# Patient Record
Sex: FEMALE | Race: WHITE | NOT HISPANIC OR LATINO
[De-identification: names, ages, dates, MRNs, and addresses within clinical notes are randomized per-mention and may not be internally consistent; named-entity substitution may affect disease eponyms.]

---

## 2018-03-13 PROBLEM — Z00.00 ENCOUNTER FOR PREVENTIVE HEALTH EXAMINATION: Status: ACTIVE | Noted: 2018-03-13

## 2018-03-28 ENCOUNTER — APPOINTMENT (OUTPATIENT)
Dept: OTOLARYNGOLOGY | Facility: CLINIC | Age: 68
End: 2018-03-28
Payer: MEDICARE

## 2018-03-28 VITALS
HEART RATE: 84 BPM | OXYGEN SATURATION: 95 % | SYSTOLIC BLOOD PRESSURE: 119 MMHG | TEMPERATURE: 98.8 F | DIASTOLIC BLOOD PRESSURE: 75 MMHG

## 2018-03-28 VITALS — WEIGHT: 110 LBS | HEIGHT: 59 IN | BODY MASS INDEX: 22.18 KG/M2

## 2018-03-28 DIAGNOSIS — Z86.018 PERSONAL HISTORY OF OTHER BENIGN NEOPLASM: ICD-10-CM

## 2018-03-28 PROCEDURE — 99204 OFFICE O/P NEW MOD 45 MIN: CPT | Mod: 25

## 2018-03-28 PROCEDURE — 31237 NSL/SINS NDSC SURG BX POLYPC: CPT | Mod: 50

## 2018-04-09 ENCOUNTER — APPOINTMENT (OUTPATIENT)
Dept: OTOLARYNGOLOGY | Facility: CLINIC | Age: 68
End: 2018-04-09
Payer: MEDICARE

## 2018-04-09 VITALS
SYSTOLIC BLOOD PRESSURE: 159 MMHG | DIASTOLIC BLOOD PRESSURE: 87 MMHG | HEART RATE: 83 BPM | TEMPERATURE: 98.2 F | OXYGEN SATURATION: 96 % | RESPIRATION RATE: 17 BRPM

## 2018-04-09 DIAGNOSIS — Z86.69 PERSONAL HISTORY OF OTHER DISEASES OF THE NERVOUS SYSTEM AND SENSE ORGANS: ICD-10-CM

## 2018-04-09 PROCEDURE — 31237 NSL/SINS NDSC SURG BX POLYPC: CPT | Mod: LT

## 2018-04-09 PROCEDURE — 99214 OFFICE O/P EST MOD 30 MIN: CPT | Mod: 25

## 2018-05-07 ENCOUNTER — APPOINTMENT (OUTPATIENT)
Dept: OTOLARYNGOLOGY | Facility: CLINIC | Age: 68
End: 2018-05-07

## 2018-05-07 ENCOUNTER — APPOINTMENT (OUTPATIENT)
Dept: OTOLARYNGOLOGY | Facility: CLINIC | Age: 68
End: 2018-05-07
Payer: MEDICARE

## 2018-05-07 VITALS
DIASTOLIC BLOOD PRESSURE: 76 MMHG | OXYGEN SATURATION: 97 % | BODY MASS INDEX: 22.18 KG/M2 | TEMPERATURE: 97.6 F | SYSTOLIC BLOOD PRESSURE: 131 MMHG | HEART RATE: 76 BPM | HEIGHT: 59 IN | WEIGHT: 110 LBS

## 2018-05-07 PROCEDURE — 99214 OFFICE O/P EST MOD 30 MIN: CPT | Mod: 25

## 2018-05-07 PROCEDURE — 31231 NASAL ENDOSCOPY DX: CPT

## 2018-05-22 ENCOUNTER — OUTPATIENT (OUTPATIENT)
Dept: OUTPATIENT SERVICES | Facility: HOSPITAL | Age: 68
LOS: 1 days | End: 2018-05-22
Payer: MEDICARE

## 2018-05-22 ENCOUNTER — RESULT REVIEW (OUTPATIENT)
Age: 68
End: 2018-05-22

## 2018-05-22 DIAGNOSIS — D32.9 BENIGN NEOPLASM OF MENINGES, UNSPECIFIED: ICD-10-CM

## 2018-05-24 LAB — SURGICAL PATHOLOGY STUDY: SIGNIFICANT CHANGE UP

## 2018-06-19 PROCEDURE — C1889: CPT

## 2018-06-19 PROCEDURE — C1887: CPT

## 2018-06-19 PROCEDURE — 88321 CONSLTJ&REPRT SLD PREP ELSWR: CPT

## 2018-06-19 PROCEDURE — C1769: CPT

## 2018-06-19 PROCEDURE — C1894: CPT

## 2019-05-08 ENCOUNTER — LABORATORY RESULT (OUTPATIENT)
Age: 69
End: 2019-05-08

## 2019-05-08 ENCOUNTER — APPOINTMENT (OUTPATIENT)
Dept: NEPHROLOGY | Facility: CLINIC | Age: 69
End: 2019-05-08
Payer: MEDICARE

## 2019-05-08 VITALS — DIASTOLIC BLOOD PRESSURE: 70 MMHG | SYSTOLIC BLOOD PRESSURE: 106 MMHG | HEART RATE: 108 BPM

## 2019-05-08 VITALS — DIASTOLIC BLOOD PRESSURE: 64 MMHG | HEART RATE: 96 BPM | SYSTOLIC BLOOD PRESSURE: 108 MMHG

## 2019-05-08 VITALS — SYSTOLIC BLOOD PRESSURE: 112 MMHG | DIASTOLIC BLOOD PRESSURE: 70 MMHG | HEART RATE: 84 BPM

## 2019-05-08 DIAGNOSIS — Z79.899 OTHER LONG TERM (CURRENT) DRUG THERAPY: ICD-10-CM

## 2019-05-08 PROCEDURE — 36415 COLL VENOUS BLD VENIPUNCTURE: CPT

## 2019-05-08 PROCEDURE — 99205 OFFICE O/P NEW HI 60 MIN: CPT | Mod: 25

## 2019-05-08 PROCEDURE — 93000 ELECTROCARDIOGRAM COMPLETE: CPT

## 2019-05-08 NOTE — PHYSICAL EXAM
[General Appearance - Alert] : alert [PERRL With Normal Accommodation] : pupils were equal in size, round, and reactive to light [Sclera] : the sclera and conjunctiva were normal [General Appearance - In No Acute Distress] : in no acute distress [Extraocular Movements] : extraocular movements were intact [Oropharynx] : the oropharynx was normal [Outer Ear] : the ears and nose were normal in appearance [Neck Appearance] : the appearance of the neck was normal [Jugular Venous Distention Increased] : there was no jugular-venous distention [Neck Cervical Mass (___cm)] : no neck mass was observed [Thyroid Diffuse Enlargement] : the thyroid was not enlarged [Thyroid Nodule] : there were no palpable thyroid nodules [Auscultation Breath Sounds / Voice Sounds] : lungs were clear to auscultation bilaterally [Heart Rate And Rhythm] : heart rate was normal and rhythm regular [Heart Sounds Pericardial Friction Rub] : no pericardial rub [Heart Sounds Gallop] : no gallops [Heart Sounds] : normal S1 and S2 [Murmurs] : no murmurs [Bowel Sounds] : normal bowel sounds [Abdomen Soft] : soft [Edema] : there was no peripheral edema [Abdomen Tenderness] : non-tender [Abdomen Mass (___ Cm)] : no abdominal mass palpated [No Spinal Tenderness] : no spinal tenderness [No CVA Tenderness] : no ~M costovertebral angle tenderness [Musculoskeletal - Swelling] : no joint swelling seen [Abnormal Walk] : normal gait [Nail Clubbing] : no clubbing  or cyanosis of the fingernails [Motor Tone] : muscle strength and tone were normal [Skin Turgor] : normal skin turgor [Skin Color & Pigmentation] : normal skin color and pigmentation [No Focal Deficits] : no focal deficits [] : no rash [Oriented To Time, Place, And Person] : oriented to person, place, and time [Impaired Insight] : insight and judgment were intact [Affect] : the affect was normal [FreeTextEntry1] : 2cm round subcutaneous mass on right lateral thigh with irregular contour

## 2019-05-08 NOTE — REASON FOR VISIT
[Initial Evaluation] : an initial evaluation [Other: _____] : [unfilled] [FreeTextEntry1] : and with complaints of fatigue and somnolence with history of recurrent meningiomas and protracted hospitalization at [facility] for meningitis.

## 2019-05-08 NOTE — ASSESSMENT
[FreeTextEntry1] : Plan:\par 1) Fatigue and somnolence: will plan to discuss previous neurology evaluations with Dr. Malia Mcnamara ([phone]) and to retrieve data of prior workup, will likely refer patient for brain MRI and EEG after review of completed evaluations with Dr. Mcnamara.\par 2) Sleep medicine: will refer patient for sleep study to rule out sleep apnea contributing to symptoms of somnolence and fatigue.\par 3) Left eye visual impairment: recommended patient schedule follow-up evaluation with Dr. Gonzales.\par 4) Dermatologic abnormalities: physical exam finding of 2cm round subcutaneous mass on right lateral thigh with irregular contour, will refer for right thigh imaging (either CT or MRI) and surgical consultation .\par 5) Abdominal evaluation: physical exam finding of protuberant abdomen, will arrange for abdominal flat plate and sonogram.\par 6) Back and right thigh pain: will arrange for lumbosacral spine MRI.\par 7) Dentistry: physical exam finding of poor dentition, will refer patient to Dr. Guille Robertson for second opinion on recommended dental work.\par 8) Shortness of breath: EKG taken today reveals poor anterior R-wave progression with much artifact, will refer patient for echocardiogram and perhaps stress test if needed.\par 9) Upcoming ear surgery: will refer patient for follow-up with Dr. Terrell prior scheduled surgery on 5/24/19. \par 10) Orthostatic pulse: patient's heart rate rises from 84bpm to 108bpm from supine to standing, recommended patient pursue cautious increase to dietary sodium for now, will consider additional therapies after review of labs drawn today.\par 11) Well-care: advised patient schedule routine gynecology check-up, colonoscopy, bone density evaluation, and mammography.\par \par Changes to Medications: none at this time.\par \par Patient's blood pressure was borderline diminished but generally acceptable in all three positions today. EKG taken today (results above). Labs were drawn and patient will return in 1 week for a follow-up appointment.

## 2019-05-08 NOTE — END OF VISIT
[FreeTextEntry3] : All medical record entries made by the Scribe were at my, Dr. Armin Caputo, direction and personally dictated by me on 05/08/2019. I have reviewed the chart and agree that the record accurately reflects my personal performance of the history, physical exam, assessment and plan. I have also personally directed, reviewed, and agreed with the chart.

## 2019-05-08 NOTE — ADDENDUM
[FreeTextEntry1] : I, Stevenson Perry, acted solely as a scribe for Dr. Armin Caputo on this date 05/08/2019.

## 2019-05-08 NOTE — HISTORY OF PRESENT ILLNESS
[FreeTextEntry1] : The patient is a 68 year old female presenting for initial evaluation of primary care with complaints of  somnolence and fatigue.\par \par Chief Complaint:\par - fatigue and somnolence with loss of appetite after diagnosis of meningitis in 2018, patient again had surgical exploration which required admission at [facility], was reportedly hospitalized for roughly 2 months and then attended rehabilitation for 2 weeks, patient now using melatonin to aid sleep and denies awareness of snoring but notes xerostomia in mornings, also reports dizziness, most recently saw Dr. Malia Mcnamara at [facility] for neurology 4 months ago, denies history of seizures.\par \par Past Medical History:\par - NIDDM of unknown duration; denies recent endocrinology follow-up; is currently on Farxiga, metformin and Trulicity injections; has maternal history of DM.\par - history of head aches for several years which was treated by an unspecified injection, denies recurrence since, now notes dizziness precipitated by startup movements.\par - complains of shortness of breath, denies chest pain.\par - complains of dermatologic abnormality on right lateral thigh which patient states has gradually grown over time and causes pain, was recently provided unspecified injection in spine which reportedly alleviated back and thigh pain.\par - left eye visual impairment which began prior to onset of meningitis in 2018, reports vision is currently "foggy", has previously seen Dr. Joan Gonzales at [facility] for ophthalmology but not recently.\par - chronic poor dentition, has seen local dentist in [place] but requests referral for second opinion.\par - denies history of HTN, denies previous cardiac or pulmonary issues.\par - reports weight near 104lbs with recent weight loss due to diminished appetite.\par - denies recent gynecology check-up, colonoscopy, mammography or bone density evaluations.\par \par Past Surgical History/ Hospitalizations:\par - meningioma resection at [facility] with Dr. Nabila Chino in ; had recurrence of meningoma and went to Dr. Virgilio Browne at [facility] on 19 for image-guided endoscopic endonasal ACF with resection of meningioma, optic nerve decompression, drainage of multiple mucoceles and duraplasty/CSF leak repair; has previously seen Dr. Peter Nidhi for follow-up after recurrent meningioma. \par - denies hospitalizations aside from the aforementioned extended admission at [facility] for meningitis, for the multiple meningioma surgeries, and for OB x9 (all full-term vaginal deliveries).\par - patient reportedly scheduled for unspecified ear surgery on 19.\par \par Family History:\par - patient's mother passed away at 69 years old with a history of heart disease and DM, and her father passed away at 91 years old due to unspecified causes.\par - patient has four siblings, one of whom  at 71 years old due to unclear gastroenterology tract tumor, and all others are reportedly well. \par - denies any other pertinent family history.\par \par Social History: \par - Patient born in [facility], her family is from [facility], moved to the  when she was 1 years old, lives in [place] with her , has 9 children, works in the house, denies smoking but has extensive second hand exposure (through ), denies EtOH use, denies living with pets, and denies recent international travel.\par \par Current Medications: Trulicity 1.5mg QW, Lipitor 20mg QD, Farxiga 5mg QD, metformin 1000mg BID, 2x levetiracetam 500mg \par \par Allergies: patient denies any known allergies to medications.

## 2019-05-09 LAB
24R-OH-CALCIDIOL SERPL-MCNC: 65.8 PG/ML
25(OH)D3 SERPL-MCNC: 34.7 NG/ML
ALBUMIN MFR SERPL ELPH: 63.8 %
ALBUMIN SERPL ELPH-MCNC: 4.6 G/DL
ALBUMIN SERPL-MCNC: 4.4 G/DL
ALBUMIN/GLOB SERPL: 1.8 RATIO
ALDOSTERONE SERUM: 29.4 NG/DL
ALP BLD-CCNC: 101 U/L
ALPHA1 GLOB MFR SERPL ELPH: 4.2 %
ALPHA1 GLOB SERPL ELPH-MCNC: 0.3 G/DL
ALPHA2 GLOB MFR SERPL ELPH: 10.4 %
ALPHA2 GLOB SERPL ELPH-MCNC: 0.7 G/DL
ALT SERPL-CCNC: 42 U/L
ANA SER IF-ACNC: NEGATIVE
ANION GAP SERPL CALC-SCNC: 15 MMOL/L
APPEARANCE: ABNORMAL
APTT BLD: 44.5 SEC
AST SERPL-CCNC: 21 U/L
B-GLOBULIN MFR SERPL ELPH: 11.2 %
B-GLOBULIN SERPL ELPH-MCNC: 0.8 G/DL
BACTERIA UR CULT: NORMAL
BACTERIA: ABNORMAL
BASOPHILS # BLD AUTO: 0.04 K/UL
BASOPHILS NFR BLD AUTO: 0.5 %
BILIRUB SERPL-MCNC: 0.3 MG/DL
BILIRUBIN URINE: NEGATIVE
BLOOD URINE: ABNORMAL
BUN SERPL-MCNC: 14 MG/DL
C3 SERPL-MCNC: 88 MG/DL
C4 SERPL-MCNC: 40 MG/DL
CALCIUM SERPL-MCNC: 9.9 MG/DL
CALCIUM SERPL-MCNC: 9.9 MG/DL
CHLORIDE ?TM UR-SCNC: 54 MMOL/L
CHLORIDE SERPL-SCNC: 105 MMOL/L
CHOLEST SERPL-MCNC: 192 MG/DL
CHOLEST/HDLC SERPL: 4.9 RATIO
CO2 SERPL-SCNC: 22 MMOL/L
COLOR: YELLOW
CORTIS SERPL-MCNC: 7.8 UG/DL
CREAT SERPL-MCNC: 0.63 MG/DL
CREAT SPEC-SCNC: 116 MG/DL
CREAT SPEC-SCNC: 116 MG/DL
CREAT/PROT UR: 0.2 RATIO
CRP SERPL-MCNC: 0.17 MG/DL
CYSTATIN C SERPL-MCNC: 1.03 MG/L
DEPRECATED KAPPA LC FREE/LAMBDA SER: 1.11 RATIO
DEPRECATED KAPPA LC FREE/LAMBDA SER: 1.11 RATIO
EOSINOPHIL # BLD AUTO: 0.35 K/UL
EOSINOPHIL NFR BLD AUTO: 4.6 %
ERYTHROCYTE [SEDIMENTATION RATE] IN BLOOD BY WESTERGREN METHOD: 16 MM/HR
ESTIMATED AVERAGE GLUCOSE: 226 MG/DL
FERRITIN SERPL-MCNC: 153 NG/ML
FOLATE SERPL-MCNC: 14.8 NG/ML
FSH SERPL-MCNC: 39.7 IU/L
GAMMA GLOB FLD ELPH-MCNC: 0.7 G/DL
GAMMA GLOB MFR SERPL ELPH: 10.4 %
GFR/BSA.PRED SERPLBLD CYS-BASED-ARV: 67 ML/MIN
GLUCOSE QUALITATIVE U: ABNORMAL
GLUCOSE SERPL-MCNC: 173 MG/DL
HBA1C MFR BLD HPLC: 9.5 %
HBV SURFACE AB SER QL: NONREACTIVE
HBV SURFACE AG SER QL: NONREACTIVE
HCT VFR BLD CALC: 39.3 %
HCV AB SER QL: NONREACTIVE
HCV S/CO RATIO: 0.19 S/CO
HCYS SERPL-MCNC: 12 UMOL/L
HDLC SERPL-MCNC: 39 MG/DL
HGB BLD-MCNC: 12.3 G/DL
IGA SER QL IEP: 152 MG/DL
IGG SER QL IEP: 503 MG/DL
IGM SER QL IEP: 394 MG/DL
IMM GRANULOCYTES NFR BLD AUTO: 0.3 %
INR PPP: 1.08 RATIO
INTERPRETATION SERPL IEP-IMP: NORMAL
IRON SATN MFR SERPL: 18 %
IRON SERPL-MCNC: 50 UG/DL
KAPPA LC CSF-MCNC: 2.35 MG/DL
KAPPA LC CSF-MCNC: 2.35 MG/DL
KAPPA LC SERPL-MCNC: 2.62 MG/DL
KAPPA LC SERPL-MCNC: 2.62 MG/DL
KETONES URINE: NEGATIVE
LDLC SERPL CALC-MCNC: 116 MG/DL
LEUKOCYTE ESTERASE URINE: ABNORMAL
LH SERPL-ACNC: 24.4 IU/L
LYMPHOCYTES # BLD AUTO: 1.86 K/UL
LYMPHOCYTES NFR BLD AUTO: 24.4 %
M PROTEIN SPEC IFE-MCNC: NORMAL
MAGNESIUM SERPL-MCNC: 1.8 MG/DL
MAN DIFF?: NORMAL
MCHC RBC-ENTMCNC: 29.1 PG
MCHC RBC-ENTMCNC: 31.3 GM/DL
MCV RBC AUTO: 93.1 FL
MICROALBUMIN 24H UR DL<=1MG/L-MCNC: 2.3 MG/DL
MICROALBUMIN/CREAT 24H UR-RTO: 20 MG/G
MICROSCOPIC-UA: NORMAL
MONOCYTES # BLD AUTO: 0.78 K/UL
MONOCYTES NFR BLD AUTO: 10.2 %
NEUTROPHILS # BLD AUTO: 4.57 K/UL
NEUTROPHILS NFR BLD AUTO: 60 %
NITRITE URINE: NEGATIVE
NT-PROBNP SERPL-MCNC: 81 PG/ML
OSMOLALITY UR: 824 MOSM/KG
PARATHYROID HORMONE INTACT: 38 PG/ML
PH URINE: 6
PHOSPHATE SERPL-MCNC: 3.5 MG/DL
PLATELET # BLD AUTO: 263 K/UL
POTASSIUM SERPL-SCNC: 4.3 MMOL/L
PROLACTIN SERPL-MCNC: 10.3 NG/ML
PROT SERPL-MCNC: 6.9 G/DL
PROT UR-MCNC: 18 MG/DL
PROTEIN URINE: NORMAL
PT BLD: 12.4 SEC
RBC # BLD: 4.22 M/UL
RBC # FLD: 13.9 %
RED BLOOD CELLS URINE: 6 /HPF
RHEUMATOID FACT SER QL: <10 IU/ML
SODIUM ?TM SUB UR QN: 20 MMOL/L
SODIUM SERPL-SCNC: 142 MMOL/L
SPECIFIC GRAVITY URINE: 1.02
SQUAMOUS EPITHELIAL CELLS: NEGATIVE
T3FREE SERPL-MCNC: 2.17 PG/ML
T3RU NFR SERPL: 1.1 TBI
T4 FREE SERPL-MCNC: 1 NG/DL
T4 SERPL-MCNC: 5.9 UG/DL
THYROGLOB AB SERPL-ACNC: <20 IU/ML
THYROPEROXIDASE AB SERPL IA-ACNC: <10 IU/ML
TIBC SERPL-MCNC: 281 UG/DL
TRIGL SERPL-MCNC: 185 MG/DL
TSH SERPL-ACNC: 0.77 UIU/ML
UIBC SERPL-MCNC: 231 UG/DL
URATE SERPL-MCNC: 4.4 MG/DL
URINE COMMENTS: NORMAL
UROBILINOGEN URINE: NORMAL
VIT B12 SERPL-MCNC: 388 PG/ML
WBC # FLD AUTO: 7.62 K/UL
WHITE BLOOD CELLS URINE: 6 /HPF

## 2019-05-13 ENCOUNTER — INPATIENT (INPATIENT)
Facility: HOSPITAL | Age: 69
LOS: 2 days | Discharge: HOME HEALTH | DRG: 101 | End: 2019-05-16
Attending: PSYCHIATRY & NEUROLOGY | Admitting: PSYCHIATRY & NEUROLOGY
Payer: MEDICARE

## 2019-05-13 ENCOUNTER — APPOINTMENT (OUTPATIENT)
Dept: NEPHROLOGY | Facility: CLINIC | Age: 69
End: 2019-05-13
Payer: MEDICARE

## 2019-05-13 VITALS — OXYGEN SATURATION: 98 % | HEART RATE: 90 BPM | WEIGHT: 103 LBS | HEIGHT: 59 IN | BODY MASS INDEX: 20.76 KG/M2

## 2019-05-13 VITALS
TEMPERATURE: 98 F | RESPIRATION RATE: 16 BRPM | WEIGHT: 102.96 LBS | SYSTOLIC BLOOD PRESSURE: 134 MMHG | OXYGEN SATURATION: 96 % | DIASTOLIC BLOOD PRESSURE: 87 MMHG | HEART RATE: 92 BPM

## 2019-05-13 VITALS — SYSTOLIC BLOOD PRESSURE: 110 MMHG | DIASTOLIC BLOOD PRESSURE: 80 MMHG | HEART RATE: 84 BPM

## 2019-05-13 DIAGNOSIS — E78.5 HYPERLIPIDEMIA, UNSPECIFIED: ICD-10-CM

## 2019-05-13 DIAGNOSIS — Z91.89 OTHER SPECIFIED PERSONAL RISK FACTORS, NOT ELSEWHERE CLASSIFIED: ICD-10-CM

## 2019-05-13 DIAGNOSIS — E11.9 TYPE 2 DIABETES MELLITUS WITHOUT COMPLICATIONS: ICD-10-CM

## 2019-05-13 DIAGNOSIS — R63.4 ABNORMAL WEIGHT LOSS: ICD-10-CM

## 2019-05-13 DIAGNOSIS — Z29.9 ENCOUNTER FOR PROPHYLACTIC MEASURES, UNSPECIFIED: ICD-10-CM

## 2019-05-13 DIAGNOSIS — R63.8 OTHER SYMPTOMS AND SIGNS CONCERNING FOOD AND FLUID INTAKE: ICD-10-CM

## 2019-05-13 DIAGNOSIS — Z98.890 OTHER SPECIFIED POSTPROCEDURAL STATES: Chronic | ICD-10-CM

## 2019-05-13 DIAGNOSIS — R56.9 UNSPECIFIED CONVULSIONS: ICD-10-CM

## 2019-05-13 DIAGNOSIS — R41.3 OTHER AMNESIA: ICD-10-CM

## 2019-05-13 LAB
ALBUMIN SERPL ELPH-MCNC: 4.1 G/DL — SIGNIFICANT CHANGE UP (ref 3.3–5)
ALP BONE SERPL-MCNC: 19 MCG/L
ALP SERPL-CCNC: 81 U/L — SIGNIFICANT CHANGE UP (ref 40–120)
ALT FLD-CCNC: 19 U/L — SIGNIFICANT CHANGE UP (ref 10–45)
ANION GAP SERPL CALC-SCNC: 10 MMOL/L — SIGNIFICANT CHANGE UP (ref 5–17)
APTT BLD: 28.2 SEC — SIGNIFICANT CHANGE UP (ref 27.5–36.3)
AST SERPL-CCNC: 17 U/L — SIGNIFICANT CHANGE UP (ref 10–40)
BASOPHILS # BLD AUTO: 0.03 K/UL — SIGNIFICANT CHANGE UP (ref 0–0.2)
BASOPHILS NFR BLD AUTO: 0.4 % — SIGNIFICANT CHANGE UP (ref 0–2)
BILIRUB SERPL-MCNC: 0.3 MG/DL — SIGNIFICANT CHANGE UP (ref 0.2–1.2)
BUN SERPL-MCNC: 9 MG/DL — SIGNIFICANT CHANGE UP (ref 7–23)
CALCIUM SERPL-MCNC: 9.6 MG/DL — SIGNIFICANT CHANGE UP (ref 8.4–10.5)
CHLORIDE SERPL-SCNC: 109 MMOL/L — HIGH (ref 96–108)
CO2 SERPL-SCNC: 25 MMOL/L — SIGNIFICANT CHANGE UP (ref 22–31)
COLLAGEN CTX SERPL-MCNC: 434 PG/ML
CREAT SERPL-MCNC: 0.67 MG/DL — SIGNIFICANT CHANGE UP (ref 0.5–1.3)
EOSINOPHIL # BLD AUTO: 0.27 K/UL — SIGNIFICANT CHANGE UP (ref 0–0.5)
EOSINOPHIL NFR BLD AUTO: 3.5 % — SIGNIFICANT CHANGE UP (ref 0–6)
GLUCOSE BLDC GLUCOMTR-MCNC: 143 MG/DL — HIGH (ref 70–99)
GLUCOSE SERPL-MCNC: 116 MG/DL — HIGH (ref 70–99)
HCT VFR BLD CALC: 35.4 % — SIGNIFICANT CHANGE UP (ref 34.5–45)
HGB BLD-MCNC: 11.3 G/DL — LOW (ref 11.5–15.5)
IMM GRANULOCYTES NFR BLD AUTO: 0.4 % — SIGNIFICANT CHANGE UP (ref 0–1.5)
INR BLD: 1.02 — SIGNIFICANT CHANGE UP (ref 0.88–1.16)
LYMPHOCYTES # BLD AUTO: 2.24 K/UL — SIGNIFICANT CHANGE UP (ref 1–3.3)
LYMPHOCYTES # BLD AUTO: 28.7 % — SIGNIFICANT CHANGE UP (ref 13–44)
MCHC RBC-ENTMCNC: 28.9 PG — SIGNIFICANT CHANGE UP (ref 27–34)
MCHC RBC-ENTMCNC: 31.9 GM/DL — LOW (ref 32–36)
MCV RBC AUTO: 90.5 FL — SIGNIFICANT CHANGE UP (ref 80–100)
METHYLMALONATE SERPL-SCNC: 146 NMOL/L
MEV IGG FLD QL IA: >300 AU/ML
MEV IGG+IGM SER-IMP: POSITIVE
MEV IGM SER QL: NEGATIVE
MONOCYTES # BLD AUTO: 0.64 K/UL — SIGNIFICANT CHANGE UP (ref 0–0.9)
MONOCYTES NFR BLD AUTO: 8.2 % — SIGNIFICANT CHANGE UP (ref 2–14)
MPO AB + PR3 PNL SER: NORMAL
NEUTROPHILS # BLD AUTO: 4.6 K/UL — SIGNIFICANT CHANGE UP (ref 1.8–7.4)
NEUTROPHILS NFR BLD AUTO: 58.8 % — SIGNIFICANT CHANGE UP (ref 43–77)
NRBC # BLD: 0 /100 WBCS — SIGNIFICANT CHANGE UP (ref 0–0)
PLATELET # BLD AUTO: 288 K/UL — SIGNIFICANT CHANGE UP (ref 150–400)
POTASSIUM SERPL-MCNC: 4.5 MMOL/L — SIGNIFICANT CHANGE UP (ref 3.5–5.3)
POTASSIUM SERPL-SCNC: 4.5 MMOL/L — SIGNIFICANT CHANGE UP (ref 3.5–5.3)
PROT SERPL-MCNC: 6.4 G/DL — SIGNIFICANT CHANGE UP (ref 6–8.3)
PROTHROM AB SERPL-ACNC: 11.5 SEC — SIGNIFICANT CHANGE UP (ref 10–12.9)
RBC # BLD: 3.91 M/UL — SIGNIFICANT CHANGE UP (ref 3.8–5.2)
RBC # FLD: 13.5 % — SIGNIFICANT CHANGE UP (ref 10.3–14.5)
SODIUM SERPL-SCNC: 144 MMOL/L — SIGNIFICANT CHANGE UP (ref 135–145)
WBC # BLD: 7.81 K/UL — SIGNIFICANT CHANGE UP (ref 3.8–10.5)
WBC # FLD AUTO: 7.81 K/UL — SIGNIFICANT CHANGE UP (ref 3.8–10.5)

## 2019-05-13 PROCEDURE — 70450 CT HEAD/BRAIN W/O DYE: CPT | Mod: 26

## 2019-05-13 PROCEDURE — 99285 EMERGENCY DEPT VISIT HI MDM: CPT

## 2019-05-13 PROCEDURE — 99215 OFFICE O/P EST HI 40 MIN: CPT

## 2019-05-13 RX ORDER — ATORVASTATIN CALCIUM 80 MG/1
1 TABLET, FILM COATED ORAL
Qty: 0 | Refills: 0 | DISCHARGE

## 2019-05-13 RX ORDER — ATORVASTATIN CALCIUM 80 MG/1
20 TABLET, FILM COATED ORAL AT BEDTIME
Refills: 0 | Status: DISCONTINUED | OUTPATIENT
Start: 2019-05-13 | End: 2019-05-16

## 2019-05-13 RX ORDER — DEXTROSE 50 % IN WATER 50 %
15 SYRINGE (ML) INTRAVENOUS ONCE
Refills: 0 | Status: DISCONTINUED | OUTPATIENT
Start: 2019-05-13 | End: 2019-05-14

## 2019-05-13 RX ORDER — DEXTROSE 50 % IN WATER 50 %
12.5 SYRINGE (ML) INTRAVENOUS ONCE
Refills: 0 | Status: DISCONTINUED | OUTPATIENT
Start: 2019-05-13 | End: 2019-05-14

## 2019-05-13 RX ORDER — METFORMIN HYDROCHLORIDE 850 MG/1
1 TABLET ORAL
Qty: 0 | Refills: 0 | DISCHARGE

## 2019-05-13 RX ORDER — HEPARIN SODIUM 5000 [USP'U]/ML
5000 INJECTION INTRAVENOUS; SUBCUTANEOUS EVERY 12 HOURS
Refills: 0 | Status: DISCONTINUED | OUTPATIENT
Start: 2019-05-13 | End: 2019-05-16

## 2019-05-13 RX ORDER — LEVETIRACETAM 250 MG/1
500 TABLET, FILM COATED ORAL
Refills: 0 | Status: DISCONTINUED | OUTPATIENT
Start: 2019-05-13 | End: 2019-05-16

## 2019-05-13 RX ORDER — SODIUM CHLORIDE 9 MG/ML
1000 INJECTION, SOLUTION INTRAVENOUS
Refills: 0 | Status: DISCONTINUED | OUTPATIENT
Start: 2019-05-13 | End: 2019-05-14

## 2019-05-13 RX ORDER — HEPARIN SODIUM 5000 [USP'U]/ML
5000 INJECTION INTRAVENOUS; SUBCUTANEOUS EVERY 8 HOURS
Refills: 0 | Status: DISCONTINUED | OUTPATIENT
Start: 2019-05-13 | End: 2019-05-13

## 2019-05-13 RX ORDER — DAPAGLIFLOZIN 10 MG/1
1 TABLET, FILM COATED ORAL
Qty: 0 | Refills: 0 | DISCHARGE

## 2019-05-13 RX ORDER — GLUCAGON INJECTION, SOLUTION 0.5 MG/.1ML
1 INJECTION, SOLUTION SUBCUTANEOUS ONCE
Refills: 0 | Status: DISCONTINUED | OUTPATIENT
Start: 2019-05-13 | End: 2019-05-14

## 2019-05-13 RX ORDER — DEXTROSE 50 % IN WATER 50 %
25 SYRINGE (ML) INTRAVENOUS ONCE
Refills: 0 | Status: DISCONTINUED | OUTPATIENT
Start: 2019-05-13 | End: 2019-05-14

## 2019-05-13 RX ORDER — INSULIN LISPRO 100/ML
VIAL (ML) SUBCUTANEOUS
Refills: 0 | Status: DISCONTINUED | OUTPATIENT
Start: 2019-05-13 | End: 2019-05-14

## 2019-05-13 RX ADMIN — ATORVASTATIN CALCIUM 20 MILLIGRAM(S): 80 TABLET, FILM COATED ORAL at 23:13

## 2019-05-13 RX ADMIN — LEVETIRACETAM 500 MILLIGRAM(S): 250 TABLET, FILM COATED ORAL at 23:13

## 2019-05-13 NOTE — ED ADULT TRIAGE NOTE - CHIEF COMPLAINT QUOTE
Patient sent in by Dr. Muñoz for "neuro exam" per daughter. Daughter states that yesterday patient's family noticed that patient was unable to remember events that occurred on Saturday. Memory has since returned. She denies numbness or tingling. No neuro deficits noted. f/s 126

## 2019-05-13 NOTE — ED PROVIDER NOTE - PHYSICAL EXAMINATION
VITAL SIGNS: I have reviewed nursing notes and confirm.  CONSTITUTIONAL: Well-developed; well-nourished; in no acute distress.   SKIN:  warm and dry, no acute rash.   HEAD:  normocephalic, atraumatic.  EYES: PERRL, EOM intact; conjunctiva and sclera clear.  ENT: No nasal discharge; airway clear.   NECK: Supple.  CARD: S1, S2 normal; no murmurs, gallops, or rubs. Regular rate and rhythm.   RESP:  Clear to auscultation b/l, no wheezes, rales or rhonchi.  ABD: Normal bowel sounds; soft; non-distended; non-tender; no guarding/ rebound.  EXT: Normal ROM. No clubbing, cyanosis or edema. 2+ pulses to b/l ue/le.  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 Neg pronator drift.  PSYCH: Cooperative, mood and affect appropriate.

## 2019-05-13 NOTE — PHYSICAL EXAM
[General Appearance - Alert] : alert [Sclera] : the sclera and conjunctiva were normal [General Appearance - In No Acute Distress] : in no acute distress [Outer Ear] : the ears and nose were normal in appearance [Extraocular Movements] : extraocular movements were intact [PERRL With Normal Accommodation] : pupils were equal in size, round, and reactive to light [Oropharynx] : the oropharynx was normal [Neck Appearance] : the appearance of the neck was normal [Neck Cervical Mass (___cm)] : no neck mass was observed [Jugular Venous Distention Increased] : there was no jugular-venous distention [Thyroid Nodule] : there were no palpable thyroid nodules [Thyroid Diffuse Enlargement] : the thyroid was not enlarged [Auscultation Breath Sounds / Voice Sounds] : lungs were clear to auscultation bilaterally [Heart Rate And Rhythm] : heart rate was normal and rhythm regular [Heart Sounds] : normal S1 and S2 [Heart Sounds Gallop] : no gallops [Murmurs] : no murmurs [Edema] : there was no peripheral edema [Heart Sounds Pericardial Friction Rub] : no pericardial rub [Bowel Sounds] : normal bowel sounds [Abdomen Tenderness] : non-tender [Abdomen Soft] : soft [No CVA Tenderness] : no ~M costovertebral angle tenderness [Abdomen Mass (___ Cm)] : no abdominal mass palpated [Abnormal Walk] : normal gait [No Spinal Tenderness] : no spinal tenderness [Nail Clubbing] : no clubbing  or cyanosis of the fingernails [Musculoskeletal - Swelling] : no joint swelling seen [Motor Tone] : muscle strength and tone were normal [Skin Color & Pigmentation] : normal skin color and pigmentation [Skin Turgor] : normal skin turgor [] : no rash [No Focal Deficits] : no focal deficits [Oriented To Time, Place, And Person] : oriented to person, place, and time [Impaired Insight] : insight and judgment were intact [Affect] : the affect was normal [FreeTextEntry1] : but vague or no memory of events over three of last five days

## 2019-05-13 NOTE — HISTORY OF PRESENT ILLNESS
[FreeTextEntry1] : The patient is a 68 year old female presenting for a follow-up evaluation and active reassessment of NIDDM, sleep disorder, anorexia, and h/o meningitis.  \par \par Interval Data:\par - Labs on 5/8/19: INR 1.08, prothrombin time 12.4 sec, APTT 44.5 sec, HbA1c 9.5, mean plasma glucose 226, glucose 173, creatinine 0.63, , total cholesterol 192, triglyceride 185, C4 40, aldosterone 29.4, cardiolipin Ab positive, cardiolipin Ab IgG 13.1, and cardiolipin Ab IgM 25.8. \par \par Current Complaints:\par - brought by family with reports of recent episodes of amnesia over several days during last week (did not recall several interactions including extensive interaction with brother); patient denies head aches, nausea, vomiting, or fever; patient does report vision worsened recently. \par \par Current Medications: Trulicity 1.5mg QW, Lipitor 20mg QD, Farxiga 5mg QD, metformin 1000mg BID, 2x levetiracetam 500mg QD.

## 2019-05-13 NOTE — ED PROVIDER NOTE - OBJECTIVE STATEMENT
Pt is a 69yo f, h/o meningioma s/p resection x 2 with recurrence, meningitis 8 mo ago, sz d/o on keppra, bib family for episodes of amnesia over the weekend. Pt does not recall family members coming to visit her. Also did not remember family member having surg 3 wks ago which is unusual for pt. She currently takes keppra but family members question compliance w/ med. No c/o fever, chills, ha, dizziness, acute vision change, slurred speech, gait difficulty, cp, sob, neck pain/ stiffness, focal n/t/w, abd pain, n/v/d, urinary sx's...

## 2019-05-13 NOTE — ED PROVIDER NOTE - TEMPLATE, MLM
[facility] Brief Operative Note    Pre-operative diagnosis: gall stones   Post-operative diagnosis Cholelithiasis      Procedure: Procedure(s):  LAPAOROSCOPIC CHOLECYSTECOMY - Wound Class: II-Clean Contaminated   Surgeon(s): Surgeon(s) and Role:     * Luke Ruffin MD - Primary     * Roberta Mcmillan Roberta Mcmillan PA-C     
General

## 2019-05-13 NOTE — ED ADULT NURSE NOTE - OBJECTIVE STATEMENT
Pt reports "I don't remember what happened on Saturday," as per the family pt was "acting normal" on Saturday however the next day did not know what happened. As per the pt's son, pt also did not remember that her  had open heart surgery 2 weeks ago. Pt alert and oriented x 3, denies headache, n/v, weakness. Pt reports blindness to left eye, however over the last 2 days has had "cloudiness" to the left eye. Pt reports her memory has resolved.

## 2019-05-13 NOTE — H&P ADULT - NSHPREVIEWOFSYSTEMS_GEN_ALL_CORE
CONSTITUTIONAL: No weakness, fevers or chills  EYES/ENT: Endorses L eye blindness;  No vertigo or throat pain   NECK: No pain or stiffness  RESPIRATORY: No cough, wheezing, hemoptysis; No shortness of breath  CARDIOVASCULAR: No chest pain or palpitations  GASTROINTESTINAL: No abdominal or epigastric pain. No nausea, vomiting, or hematemesis; No diarrhea or constipation. No melena or hematochezia.  GENITOURINARY: No dysuria, frequency or hematuria  NEUROLOGICAL: Chronic paralysis of L forehead muscles from previous operations  SKIN: No itching, burning, rashes, or lesions   All other review of systems is negative unless indicated above.

## 2019-05-13 NOTE — ED ADULT NURSE NOTE - NSIMPLEMENTINTERV_GEN_ALL_ED
Implemented All Universal Safety Interventions:  [place] to call system. Call bell, personal items and telephone within reach. Instruct patient to call for assistance. Room bathroom lighting operational. Non-slip footwear when patient is off stretcher. Physically safe environment: no spills, clutter or unnecessary equipment. Stretcher in lowest position, wheels locked, appropriate side rails in place.

## 2019-05-13 NOTE — H&P ADULT - NSHPPHYSICALEXAM_GEN_ALL_CORE
Constitutional: Elderly female laying in bed in NAD  Head: NC/AT, paralysis of L forehead muscles (chronic)  Eyes: PERRL, EOMI, anicteric sclera  ENT: no nasal discharge; uvula midline, no oropharyngeal erythema or exudates; MMM  Neck: supple; no JVD or thyromegaly  Respiratory: CTA B/L; no W/R/R, no retractions  Cardiac: +S1/S2; RRR; no M/R/G; PMI non-displaced  Gastrointestinal: soft, nontender however abdomen mildly tense  Extremities: WWP, no clubbing or cyanosis; no peripheral edema  Musculoskeletal: NROM x4; no joint swelling, tenderness or erythema  Vascular: 2+ radial, femoral, DP/PT pulses B/L  Dermatologic: skin warm, dry and intact; no rashes, wounds, or scars  Neurologic: AAOx3-knew it was 2019 but thought it was March; CNII-XII grossly intact; no focal deficits

## 2019-05-13 NOTE — H&P ADULT - NSHPLABSRESULTS_GEN_ALL_CORE
.  LABS:                         11.3   7.81  )-----------( 288      ( 13 May 2019 16:52 )             35.4     05-13    144  |  109<H>  |  9   ----------------------------<  116<H>  4.5   |  25  |  0.67    Ca    9.6      13 May 2019 16:52    TPro  6.4  /  Alb  4.1  /  TBili  0.3  /  DBili  x   /  AST  17  /  ALT  19  /  AlkPhos  81  05-13    PT/INR - ( 13 May 2019 16:52 )   PT: 11.5 sec;   INR: 1.02          PTT - ( 13 May 2019 16:52 )  PTT:28.2 sec              RADIOLOGY, EKG & ADDITIONAL TESTS: Reviewed.

## 2019-05-13 NOTE — ADDENDUM
[FreeTextEntry1] : I, Stevenson Perry, acted solely as a scribe for Dr. Armin Caputo on this date 05/13/2019.

## 2019-05-13 NOTE — H&P ADULT - PROBLEM SELECTOR PLAN 2
-Patient with history of meningioma resected 25 years ago, then again 1 year ago complicated by meningitis that required surgery as well.  Patient takes keppra 500mg BID at home for seizures.  Starting to have memory loss issues, unclear if the two are related.  Patient endorses that she does not take keppra at night usually, only when her family is around and makes her.  -Continue with keppra 500mg BID  -VEEG monitoring

## 2019-05-13 NOTE — H&P ADULT - HISTORY OF PRESENT ILLNESS
68 year old female with PMH DM, HLD, seizures, meningioma resected 25 years ago and ago 1 year ago, second operation complicated by meningitis which also required surgery who presents with 2 days of amnesia.  The patient was 68 year old female with PMH DM, HLD, seizures, meningioma resected 25 years ago and ago 1 year ago, second operation complicated by meningitis which also required surgery who presents with 2 days of amnesia.  According to the son at bedside, the patient lost two days of memory.  She spent time with her family on Saturday and Sunday but on Sunday evening, did not remember who she spent time with.  She also forgot that her  recently had surgery.  She also did not know the day of the week.  According to her son at bedside, at baseline the patient never has problems with memory and is usually very sharp.  Her family instructed to bring her to Dr. Caputo today who sent her to the ED for further evaluation.  Upon arrival to the ED, vital signs were /87, HR 92, RR 16, temperature 97.8 degrees and saturating 96% on room air.  CT head showed no acute intracranial hemorrhage, transcortical infarction or mass effect but did show encephalomalacic changes in the frontal lobes.  Patient was admitted for further workup of forgetfulness. 68 year old female with PMH DM, HLD, seizures, L eye blindness, meningioma resected 25 years ago and again 1 year ago, (second operation complicated by meningitis which also required surgery) who presents with 2 days of amnesia.  According to the son at bedside, the patient lost two days of memory.  She spent time with her family on Saturday and Sunday but on Sunday evening, did not remember who she spent time with.  She also forgot that her  recently had surgery or the day of the week.  According to her son, at baseline the patient never has problems with memory and is usually very sharp.  Her family was instructed to bring her to Dr. Caputo today who sent her to the ED for further evaluation.  Upon arrival to the ED, vital signs were /87, HR 92, RR 16, temperature 97.8 degrees and saturating 96% on room air.  CT head showed no acute intracranial hemorrhage, transcortical infarction or mass effect but did show encephalomalacic changes in the frontal lobes.  Patient was admitted for further workup of forgetfulness.

## 2019-05-13 NOTE — H&P ADULT - PROBLEM SELECTOR PLAN 7
-PCP Contacted on Admission: (Y/N) --> Name & Phone #: Dr. Caputo [phone]  -Date of Contact with PCP:  -PCP Contacted at Discharge: (Y/N, N/A)  -Summary of Handoff Given to PCP:   -Post-Discharge Appointment Date and Location:

## 2019-05-13 NOTE — H&P ADULT - PROBLEM SELECTOR PLAN 1
-According to the son at bedside, the patient lost two days of memory.  She spent time with her family on Saturday and Sunday but on Sunday evening, did not remember who she spent time with.  She also forgot that her  recently had surgery.  She also did not know the day of the week.  At baseline the patient never has problems with memory and is usually very sharp.   -On exam, patient knows, name, location and year, thought it was March not May  -Was able to name all 9 children in order  -Per patient, not compliant with keppra at night, only takes it when her family is around to tell her, could be component of seizures, continue with keppra 500mg BID and VEEG monitoring  -Follow up B12, TSH, RPR, folate in AM -According to the son at bedside, the patient lost two days of memory.  She spent time with her family on Saturday and Sunday but on Sunday evening, did not remember who she spent time with.  She also forgot that her  recently had surgery and also did not know the day of the week.  At baseline the patient never has problems with memory and is usually very sharp.   -On exam, patient knows, name, location and year but thought it was March not May  -Was able to name all 9 children in age order  -Per patient, not compliant with keppra at night, only takes it when her family is around to tell her, could be component of seizures, continue with keppra 500mg BID and VEEG monitoring  -Follow up B12, TSH, RPR, folate in AM

## 2019-05-13 NOTE — H&P ADULT - PROBLEM SELECTOR PLAN 3
-Patient with history of diabetes mellitus, takes metformin 500mg BID at home as well as farxiga 5mg daily.  -Continue with insulin sliding scale  -Continue with consistent carbohydrate diet

## 2019-05-13 NOTE — PATIENT PROFILE ADULT - BRADEN ACTIVITY
- HD resumed on previous admission. Anuric on admission and dialyzes M-W-F.  - Nephrology following.    - Goal is for kidney txp in future- currently not a candidate due to health status.  - urine output has improved since 3/16 --> 2.6 L 3/27!  - HD held 3/21 & 3/23  - Nephrology started torsemide 50 mg daily 3/24- continue for now for volume management  - BUN elevated though on TPN, HD 3/29/18   - Will plan HD twice weekly for now   -hold HD 4/3/18 as creatinine improved today  - Hold torsemide for now (d/roselia on 3/31)   (3) walks occasionally

## 2019-05-13 NOTE — H&P ADULT - ASSESSMENT
68 year old female with PMH DM, HLD, seizures, meningioma resected 25 years ago and ago 1 year ago, second operation complicated by meningitis which also required surgery who presents with 2 days of amnesia.

## 2019-05-13 NOTE — ED PROVIDER NOTE - CLINICAL SUMMARY MEDICAL DECISION MAKING FREE TEXT BOX
Impression: episodes of amnesia x few days, with h/o meningioma w/ recurrence. Also with h/o sz d/o on keppra with ? compliance as per family. Neuro exam non-focal except for chronic left forehead paralysis. Labs reviewed and wnl. Case d/w Montserrat Espinosa and Toni. Will obtain non-con ct head to r/o bleed/ infarct. If neg, pt to be admitted to [facility] under Dr. Moody for further w/u including video eeg.

## 2019-05-13 NOTE — END OF VISIT
[FreeTextEntry3] : All medical record entries made by the Scribe were at my, Dr. Armin Caputo, direction and personally dictated by me on 05/13/2019. I have reviewed the chart and agree that the record accurately reflects my personal performance of the history, physical exam, assessment and plan. I have also personally directed, reviewed, and agreed with the chart.

## 2019-05-13 NOTE — REASON FOR VISIT
[Follow-Up] : a follow-up visit [Other: _____] : [unfilled] [FreeTextEntry1] : with complaints of recent episodes of amnesia.

## 2019-05-13 NOTE — ASSESSMENT
[FreeTextEntry1] : Plan:\par 1) NIDDM: HbA1c 9.5 uncontrolled, patient to have ED evaluation detailed below for DM control.\par 2) HLD: lipids elevated on previous labs, will plan to initiate therapy at patient's next visit if again indicated on labs which will be taken at [facility] ED.\par 3) Acute amnesia: patient unable to recall recent events but long-term memory apparently intact; recommend patient go to ED at [facility] for further workup including blood work, brain MRI, and neurology and otorhinolaryngology consultation; she will also require endocrinology evaluation to regulate uncontrolled hyperglycemia; patient and family are amenable to this plan and will travel to ED via cab; have placed call to neurology service at [facility] to prepare for eventual admission.\par 4) Seizure disorder: continue Keppra, will discuss further evaluation with perhaps repeat EEG and neurology treatment, have discussed referral to ER with Dr. Moody.\par \par Changes to Medications: none.\par \par The patient's blood pressure was acceptable today. Labs were not drawn today and will be deferred to ED evaluation. Patient's return will depend on course of treatment.

## 2019-05-14 PROBLEM — E78.5 HYPERLIPIDEMIA, UNSPECIFIED: Chronic | Status: ACTIVE | Noted: 2019-05-13

## 2019-05-14 PROBLEM — R56.9 UNSPECIFIED CONVULSIONS: Chronic | Status: ACTIVE | Noted: 2019-05-13

## 2019-05-14 PROBLEM — D32.9 BENIGN NEOPLASM OF MENINGES, UNSPECIFIED: Chronic | Status: ACTIVE | Noted: 2019-05-13

## 2019-05-14 PROBLEM — E11.9 TYPE 2 DIABETES MELLITUS WITHOUT COMPLICATIONS: Chronic | Status: ACTIVE | Noted: 2019-05-13

## 2019-05-14 LAB
ANION GAP SERPL CALC-SCNC: 10 MMOL/L — SIGNIFICANT CHANGE UP (ref 5–17)
BUN SERPL-MCNC: 10 MG/DL — SIGNIFICANT CHANGE UP (ref 7–23)
C1Q IMMUNE COMPLEX: <1.2 UG EQ/ML
C3D IMMUNE COMPLEXES: 11.8 UG EQ/ML
CALCIUM SERPL-MCNC: 9.4 MG/DL — SIGNIFICANT CHANGE UP (ref 8.4–10.5)
CHLORIDE SERPL-SCNC: 109 MMOL/L — HIGH (ref 96–108)
CO2 SERPL-SCNC: 25 MMOL/L — SIGNIFICANT CHANGE UP (ref 22–31)
CREAT SERPL-MCNC: 0.64 MG/DL — SIGNIFICANT CHANGE UP (ref 0.5–1.3)
CRP SERPL-MCNC: 0.07 MG/DL — SIGNIFICANT CHANGE UP (ref 0–0.4)
ERYTHROCYTE [SEDIMENTATION RATE] IN BLOOD: 19 MM/HR — SIGNIFICANT CHANGE UP
FOLATE SERPL-MCNC: 14.6 NG/ML — SIGNIFICANT CHANGE UP
GLUCOSE BLDC GLUCOMTR-MCNC: 115 MG/DL — HIGH (ref 70–99)
GLUCOSE BLDC GLUCOMTR-MCNC: 119 MG/DL — HIGH (ref 70–99)
GLUCOSE BLDC GLUCOMTR-MCNC: 142 MG/DL — HIGH (ref 70–99)
GLUCOSE BLDC GLUCOMTR-MCNC: 182 MG/DL — HIGH (ref 70–99)
GLUCOSE SERPL-MCNC: 116 MG/DL — HIGH (ref 70–99)
HBA1C BLD-MCNC: 8.8 % — HIGH (ref 4–5.6)
HCT VFR BLD CALC: 34.1 % — LOW (ref 34.5–45)
HGB BLD-MCNC: 11.2 G/DL — LOW (ref 11.5–15.5)
MAGNESIUM SERPL-MCNC: 1.9 MG/DL — SIGNIFICANT CHANGE UP (ref 1.6–2.6)
MCHC RBC-ENTMCNC: 29.9 PG — SIGNIFICANT CHANGE UP (ref 27–34)
MCHC RBC-ENTMCNC: 32.8 GM/DL — SIGNIFICANT CHANGE UP (ref 32–36)
MCV RBC AUTO: 90.9 FL — SIGNIFICANT CHANGE UP (ref 80–100)
NRBC # BLD: 0 /100 WBCS — SIGNIFICANT CHANGE UP (ref 0–0)
PLATELET # BLD AUTO: 279 K/UL — SIGNIFICANT CHANGE UP (ref 150–400)
POTASSIUM SERPL-MCNC: 3.8 MMOL/L — SIGNIFICANT CHANGE UP (ref 3.5–5.3)
POTASSIUM SERPL-SCNC: 3.8 MMOL/L — SIGNIFICANT CHANGE UP (ref 3.5–5.3)
RBC # BLD: 3.75 M/UL — LOW (ref 3.8–5.2)
RBC # FLD: 13.5 % — SIGNIFICANT CHANGE UP (ref 10.3–14.5)
SODIUM SERPL-SCNC: 144 MMOL/L — SIGNIFICANT CHANGE UP (ref 135–145)
T PALLIDUM AB TITR SER: NEGATIVE — SIGNIFICANT CHANGE UP
TSH SERPL-MCNC: 1.41 UIU/ML — SIGNIFICANT CHANGE UP (ref 0.35–4.94)
VIT B12 SERPL-MCNC: 308 PG/ML — SIGNIFICANT CHANGE UP (ref 232–1245)
WBC # BLD: 6.65 K/UL — SIGNIFICANT CHANGE UP (ref 3.8–10.5)
WBC # FLD AUTO: 6.65 K/UL — SIGNIFICANT CHANGE UP (ref 3.8–10.5)

## 2019-05-14 PROCEDURE — 99223 1ST HOSP IP/OBS HIGH 75: CPT

## 2019-05-14 PROCEDURE — 95951: CPT | Mod: 26

## 2019-05-14 PROCEDURE — 99222 1ST HOSP IP/OBS MODERATE 55: CPT

## 2019-05-14 RX ORDER — GLUCAGON INJECTION, SOLUTION 0.5 MG/.1ML
1 INJECTION, SOLUTION SUBCUTANEOUS ONCE
Refills: 0 | Status: DISCONTINUED | OUTPATIENT
Start: 2019-05-14 | End: 2019-05-16

## 2019-05-14 RX ORDER — DEXTROSE 50 % IN WATER 50 %
12.5 SYRINGE (ML) INTRAVENOUS ONCE
Refills: 0 | Status: DISCONTINUED | OUTPATIENT
Start: 2019-05-14 | End: 2019-05-16

## 2019-05-14 RX ORDER — DEXTROSE 50 % IN WATER 50 %
15 SYRINGE (ML) INTRAVENOUS ONCE
Refills: 0 | Status: DISCONTINUED | OUTPATIENT
Start: 2019-05-14 | End: 2019-05-16

## 2019-05-14 RX ORDER — INSULIN LISPRO 100/ML
VIAL (ML) SUBCUTANEOUS
Refills: 0 | Status: DISCONTINUED | OUTPATIENT
Start: 2019-05-14 | End: 2019-05-14

## 2019-05-14 RX ORDER — INSULIN LISPRO 100/ML
VIAL (ML) SUBCUTANEOUS
Refills: 0 | Status: DISCONTINUED | OUTPATIENT
Start: 2019-05-14 | End: 2019-05-16

## 2019-05-14 RX ORDER — POTASSIUM CHLORIDE 20 MEQ
20 PACKET (EA) ORAL ONCE
Refills: 0 | Status: COMPLETED | OUTPATIENT
Start: 2019-05-14 | End: 2019-05-14

## 2019-05-14 RX ORDER — DEXTROSE 50 % IN WATER 50 %
25 SYRINGE (ML) INTRAVENOUS ONCE
Refills: 0 | Status: DISCONTINUED | OUTPATIENT
Start: 2019-05-14 | End: 2019-05-16

## 2019-05-14 RX ORDER — MAGNESIUM SULFATE 500 MG/ML
1 VIAL (ML) INJECTION ONCE
Refills: 0 | Status: COMPLETED | OUTPATIENT
Start: 2019-05-14 | End: 2019-05-14

## 2019-05-14 RX ORDER — SODIUM CHLORIDE 9 MG/ML
1000 INJECTION, SOLUTION INTRAVENOUS
Refills: 0 | Status: DISCONTINUED | OUTPATIENT
Start: 2019-05-14 | End: 2019-05-16

## 2019-05-14 RX ADMIN — HEPARIN SODIUM 5000 UNIT(S): 5000 INJECTION INTRAVENOUS; SUBCUTANEOUS at 18:02

## 2019-05-14 RX ADMIN — ATORVASTATIN CALCIUM 20 MILLIGRAM(S): 80 TABLET, FILM COATED ORAL at 21:43

## 2019-05-14 RX ADMIN — LEVETIRACETAM 500 MILLIGRAM(S): 250 TABLET, FILM COATED ORAL at 21:43

## 2019-05-14 RX ADMIN — Medication 2: at 21:50

## 2019-05-14 RX ADMIN — LEVETIRACETAM 500 MILLIGRAM(S): 250 TABLET, FILM COATED ORAL at 09:04

## 2019-05-14 RX ADMIN — Medication 100 GRAM(S): at 08:47

## 2019-05-14 RX ADMIN — Medication 20 MILLIEQUIVALENT(S): at 08:47

## 2019-05-14 RX ADMIN — HEPARIN SODIUM 5000 UNIT(S): 5000 INJECTION INTRAVENOUS; SUBCUTANEOUS at 06:14

## 2019-05-14 NOTE — PHYSICAL THERAPY INITIAL EVALUATION ADULT - GENERAL OBSERVATIONS, REHAB EVAL
Patient received semi-supine no acute distress +EEG, cleared for PT by CATHY So, agreeable to PT Eval, daughter present. Left seated out of bed no acute distress +call bell, daughter present, CATHY So aware. FIM 2

## 2019-05-14 NOTE — CONSULT NOTE ADULT - SUBJECTIVE AND OBJECTIVE BOX
Patient is a 68y old  Female who presents with a chief complaint of Amnesia (13 May 2019 22:26)       HPI:  68 year old female with PMH DM, HLD, seizures, L eye blindness, meningioma resected 25 years ago and again 1 year ago, (second operation complicated by meningitis which also required surgery) who presents with 2 days of amnesia.  According to the son at bedside, the patient lost two days of memory.  She spent time with her family on Saturday and Sunday but on Sunday evening, did not remember who she spent time with.  She also forgot that her  recently had surgery or the day of the week.  According to her son, at baseline the patient never has problems with memory and is usually very sharp.  Her family was instructed to bring her to Dr. Caputo today who sent her to the ED for further evaluation.  Upon arrival to the ED, vital signs were /87, HR 92, RR 16, temperature 97.8 degrees and saturating 96% on room air.  CT head showed no acute intracranial hemorrhage, transcortical infarction or mass effect but did show encephalomalacic changes in the frontal lobes.  Patient was admitted for further workup of forgetfulness. (13 May 2019 22:26)      PAST MEDICAL & SURGICAL HISTORY:  Hyperlipidemia  Diabetes  Seizures  Meningioma  S/P resection of meningioma      MEDICATIONS  (STANDING):  atorvastatin 20 milliGRAM(s) Oral at bedtime  dextrose 5%. 1000 milliLiter(s) (50 mL/Hr) IV Continuous <Continuous>  dextrose 50% Injectable 12.5 Gram(s) IV Push once  dextrose 50% Injectable 25 Gram(s) IV Push once  dextrose 50% Injectable 25 Gram(s) IV Push once  heparin  Injectable 5000 Unit(s) SubCutaneous every 12 hours  insulin lispro (HumaLOG) corrective regimen sliding scale   SubCutaneous Before meals and at bedtime  levETIRAcetam 500 milliGRAM(s) Oral two times a day    MEDICATIONS  (PRN):  dextrose 40% Gel 15 Gram(s) Oral once PRN Blood Glucose LESS THAN 70 milliGRAM(s)/deciliter  glucagon  Injectable 1 milliGRAM(s) IntraMuscular once PRN Glucose LESS THAN 70 milligrams/deciliter      Social History:    Functional Level Prior to Admission:    FAMILY HISTORY:  FH: heart disease      CBC Full  -  ( 14 May 2019 06:18 )  WBC Count : 6.65 K/uL  RBC Count : 3.75 M/uL  Hemoglobin : 11.2 g/dL  Hematocrit : 34.1 %  Platelet Count - Automated : 279 K/uL  Mean Cell Volume : 90.9 fl  Mean Cell Hemoglobin : 29.9 pg  Mean Cell Hemoglobin Concentration : 32.8 gm/dL  Auto Neutrophil # : x  Auto Lymphocyte # : x  Auto Monocyte # : x  Auto Eosinophil # : x  Auto Basophil # : x  Auto Neutrophil % : x  Auto Lymphocyte % : x  Auto Monocyte % : x  Auto Eosinophil % : x  Auto Basophil % : x      05-14    144  |  109<H>  |  10  ----------------------------<  116<H>  3.8   |  25  |  0.64    Ca    9.4      14 May 2019 06:18  Mg     1.9     05-14    TPro  6.4  /  Alb  4.1  /  TBili  0.3  /  DBili  x   /  AST  17  /  ALT  19  /  AlkPhos  81  05-13            Radiology:    < from: CT Head No Cont (05.13.19 @ 19:31) >  EXAM:  CT BRAIN                          PROCEDURE DATE:  05/13/2019          INTERPRETATION:  IJared MD, have reviewed the images and   the report and agree with the findings, with the following modification:    FINAL   IMPRESSION:    There is no acute intracranial hemorrhage, mass effect or midline shift.   There is extensive encephalomalacia and gliosis in the anterior frontal   lobes, left slightly worse than right, in this patient status post   bifrontal craniotomy and anterior skull base surgery. There is   considerable expected enlargement of the frontal horns of the lateral   ventricles, spanning 5.3 cm, previously 4.8 cm, an increase that may be   due to further involution of frontal lobe injury. The third ventricle is   unchanged in size, and there is no rounding of the temporal horns to   imply acute hydrocephalus.    Better seen on outside MRI from 04/17/2018, there is small right frontal   meningioma, with underlying dense/hyperostotic calvarium seen on CT.  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      INDICATIONS: Altered mental status. Memory loss. Multiple brain   surgeries.     TECHNIQUE: Serial axial images were obtained from the skull base to the   vertex without the use of intravenous contrast. Imaging is performed   using helical low-dose technique, and sagittal and coronal reformations   are provided.    COMPARISON EXAMINATION: Outside CT head from 2/1/2018 and 4/18/2018. MRI   from 4/17/2018.    FINDINGS:    There are encephalomalacia changes in the inferior frontal lobes   bilaterally with ex vacuo dilatation of the frontal horn of the lateral   ventricle. There is otherwise mild patchy hypodensity within the   periventricular white matter which is nonspecific and may represent the   sequela of small vessel ischemic disease. The gray-white differentiation   is otherwise preserved without acute transcortical infarction. There is a   1.2 cm soft tissue density, apparently present on MRI from 4/17/2018, may   represent a meningioma. No acute intracranial hemorrhage. No mass effect   or midline shift.   VISUALIZED SINUSES: Opacification of the frontal and anterior ethmoid air   cells. Mild mucosal thickening of the remaining visualized sinuses.   Absence of the nasal septum, left maxillary sinus wall and sphenoid sinus   walls, as seen on prior studies.  VISUALIZED MASTOIDS: Well developed and aerated bilaterally.  CALVARIUM: Status post bifrontal craniotomies. Status post right frontal   joi hole.  MISCELLANEOUS: Lenses are absent bilaterally. Partially enlarged sella.    IMPRESSION:   No acute intracranial hemorrhage, transcortical infarction or mass               Vital Signs Last 24 Hrs  T(C): 36.9 (14 May 2019 07:30), Max: 36.9 (14 May 2019 07:30)  T(F): 98.5 (14 May 2019 07:30), Max: 98.5 (14 May 2019 07:30)  HR: 76 (14 May 2019 07:30) (76 - 92)  BP: 97/60 (14 May 2019 07:30) (97/60 - 134/87)  BP(mean): --  RR: 14 (14 May 2019 07:30) (14 - 16)  SpO2: 94% (14 May 2019 07:30) (94% - 99%)    REVIEW OF SYSTEMS:    CONSTITUTIONAL: No fever, weight loss, or fatigue  EYES: No eye pain, visual disturbances, or discharge  ENMT:  No difficulty hearing, tinnitus, vertigo; No sinus or throat pain  NECK: No pain or stiffness  BREASTS: No pain, masses, or nipple discharge  RESPIRATORY: No cough, wheezing, chills or hemoptysis; No shortness of breath  CARDIOVASCULAR: No chest pain, palpitations, dizziness, or leg swelling  GASTROINTESTINAL: No abdominal or epigastric pain. No nausea, vomiting, or hematemesis; No diarrhea or constipation. No melena or hematochezia.  GENITOURINARY: No dysuria, frequency, hematuria, or incontinence  NEUROLOGICAL: No headaches, memory loss, loss of strength, numbness, or tremors  SKIN: No itching, burning, rashes, or lesions   LYMPH NODES: No enlarged glands  ENDOCRINE: No heat or cold intolerance; No hair loss  MUSCULOSKELETAL: No joint pain or swelling; No muscle, back, or extremity pain  PSYCHIATRIC: No depression, anxiety, mood swings, or difficulty sleeping  HEME/LYMPH: No easy bruising, or bleeding gums  ALLERGY AND IMMUNOLOGIC: No hives or eczema  VASCULAR: no swelling, erythema      Physical Exam: WDWN 69 yo  woman lying in semi Guerrero's position, w/o complaints    Head: normocephalic, atraumatic, flattening left forehead (chronic)    Eyes: PERRLA, EOMI, no nystagmus, sclera anicteric    ENT: nasal discharge, uvula midline, no oropharyngeal erythema/exudate    Neck: supple, negative JVD, negative carotid bruits, no thyromegaly    Chest: CTA bilaterally, neg wheeze, rhonchi, rales, crackles, egophany    Cardiovascular: regular rate and rhythm, neg murmurs/rubs/gallops    Abdomen: soft, non distended, non tender, negative rebound/guarding, normal bowel sounds, neg hepatosplenomegaly    Extremities: WWP, neg cyanosis/clubbing/edema, negative calf tenderness to palpation, negative Sunny's sign    :     Neurologic Exam:    Alert and oriented to person, place, date/year, speech fluent w/o dysarthria, recent and remote memory intact, repetition intact, comprehension intact,     Cranial Nerves:     II:                       pupils equal, round and reactive to light, visual fields intact OD, dec OS  III/ IV/VI:             extraocular movements intact, neg nystagmus, neg ptosis  V:                       facial sensation intact, V1-3 normal  VII:                     face symmetric, no droop, normal eye closure and smile  VIII:                    hearing intact to finger rub bilaterally  IX/ X:                 soft palate rise symmetrical  XI:                      head turning, shoulder shrug normal  XII:                     tongue midline    Motor Exam:    Upper Extremities:     RIght:   no focal weakness               negative drift    Left :    no focal weakness               negative drift    Lower Extremities:                 Right:     no focal weakness                 Left:       no focal weakness                   Sensory:    intact to LT/PP in all UE/LE dermatomes    DTR:            = biceps/     triceps/     brachioradialis                      = patella/   medial hamstring/ankle                      neg clonus                      neg Babinski                        Finger to Nose:  wnl    Heel to Shin:  wnl    Rapid Alternating movements:  wnl    Joint Position Sense:  intact    Romberg:  not tested    Tandem Walking:  not tested    Gait:  not tested        PM&R Impression:    1) amnesia/ r/o seizure  2) no focal weakness        Recommendations:    1) Physical therapy focusing on therapeutic exercises, bed mobility/transfer out of bed evaluation, progressive ambulation with assistive devices prn.    2) Anticipated Disposition Plan/Recs: probable d/c home
HPI:    68 year old woman seen by Dr. Caputo yesterday in office. Reportedly she has a history of seizures but often doesn't take her keppra. For the last 2 days prior to admission, she had amnesia, and forgot important events over the last week, such as who she met over the weekend and that her  had sugery. No reported episodes of hypoglycemia. No motor activity of seizures noted.     Her diabetes is controlled with metformin and farxiga.     PAST MEDICAL & SURGICAL HISTORY:  Hyperlipidemia  Diabetes  Seizures  Meningioma  S/P resection of meningioma      MEDICATIONS:  atorvastatin 20 milliGRAM(s) Oral at bedtime  dextrose 40% Gel 15 Gram(s) Oral once PRN  dextrose 5%. 1000 milliLiter(s) IV Continuous <Continuous>  dextrose 50% Injectable 12.5 Gram(s) IV Push once  dextrose 50% Injectable 25 Gram(s) IV Push once  dextrose 50% Injectable 25 Gram(s) IV Push once  glucagon  Injectable 1 milliGRAM(s) IntraMuscular once PRN  heparin  Injectable 5000 Unit(s) SubCutaneous every 12 hours  insulin lispro (HumaLOG) corrective regimen sliding scale   SubCutaneous Before meals and at bedtime  levETIRAcetam 500 milliGRAM(s) Oral two times a day      FH: heart disease  No family history of cardiac disease      SOCIAL HISTORY: no smoking or ETOH.    REVIEW OF SYSTEMS:  Constitutional: No fevers.   Card: No chest pain.   Resp: No SOB.  GI: No nausea or vomiting. No abdominal pain.  : No dysuria. Neuro: No focal weakness or sensory loss.  Eyes: No visual symptoms. MS: No joint swelling.  Skin: No rashes. Psych: No psychosis.    PHYSICAL EXAM:    Constitutional: T(C): 36.4 (14 May 2019 11:15), Max: 36.9 (14 May 2019 07:30)  HR: 91 (14 May 2019 11:15) (76 - 96)  BP: 130/84 (14 May 2019 11:15) (97/60 - 130/84)  RR: 16 (14 May 2019 11:15) (14 - 16)  SpO2: 95% (14 May 2019 11:15) (94% - 99%)  Appearance: Alert, pleasant, INAD.  Eyes: Conjunctivae pink, moist. Pupils equal and reactive.  ENT: External ears/nose normal appearing. Lips normal, dentition good.  Lymphatic: No cervical lymphadenopathy. No supraclavicular lymphadenopathy.  Cardiac: No rubs. NO MURMURS. Extremities: NO LE EDEMA.  Respiratory: Effort no accessory muscle use. Lungs: CLEAR TO AUSCULTATION.  Abdomen: Soft. No masses. Nontender. Liver: Not palpable. Spleen: Not palpable.  Musculoskeltal digits: No clubbing or cyanosis. Tone normal. Moves all four extremities.  Skin inspection: No rashes. Palpation: No abnormalities.  Neuro: Cranial nerves: no facial assymetry or deficits noted. Sensation: LE intact to light touch.  Psych: Oriented to person, place Mood/affect: Not depressed.     DATA:  144    |  109<H>  |  10     ----------------------------<  116<H>  Ca:9.4   (14 May 2019 06:18)  3.8     |  25     |  0.64       eGFR if Non : 90  eGFR if : 105    TPro  6.4    /  Alb  4.1    /  TBili  0.3    /  DBili  x      /  AST  17     /  ALT  19     /  AlkPhos  81     13 May 2019 16:52    SCr 0.64 [14 May 2019 06:18]  SCr 0.67 [13 May 2019 16:52]                          11.2<L>  6.65  )-----------( 279      ( 14 May 2019 06:18 )             34.1<L>    Phos:-- Mg:-- PTH:-- Uric acid:-- Serum Osm:--  Ferritin:-- Iron:-- TIBC:-- Tsat:--  B12:-- TSH:308 pg/mL (14 May 2019 11:23)

## 2019-05-14 NOTE — CONSULT NOTE ADULT - ASSESSMENT
68 year old woman with DM, previous seizures, presents with amnesia. Suspect seizures. DM controlled. Slight hypokalemia.    Suggest:    1. OK to restart metformin (will not cause hypoglycemia) and follow FSBGs. Can hold farxiga for now.  2. Keep K>4 Mg>2.    Please call with any questions.    Dontrell Mike MD, FACP, FASN | kidney.[facility]  Nephrology, Hypertension, and Internal Medicine  Mobile: [phone] (Daytime Hours Only)  Office/Answering Service: [phone]  Asst. Prof. of Medicine, [employer] at [facility] - Nephrology at [place] [address]  [address] [place] [address], [address], [place], [place]
68 year old female with PMH DM, HLD, seizures, meningioma resected 25 years ago and ago 1 year ago, second operation complicated by meningitis which also required surgery who presents with 2 days of amnesia.     Problem/Plan - 1:  ·  Problem: Memory loss.  Plan: -According to the son at bedside, the patient lost two days of memory.  She spent time with her family on Saturday and Sunday but on Sunday evening, did not remember who she spent time with.  She also forgot that her  recently had surgery and also did not know the day of the week.  At baseline the patient never has problems with memory and is usually very sharp.   -On exam, patient knows, name, location and year but thought it was March not May  -Was able to name all 9 children in age order  -Per patient, not compliant with keppra at night, only takes it when her family is around to tell her, could be component of seizures, continue with keppra 500mg BID and VEEG monitoring  -Follow up B12, TSH, RPR, folate in AM.    Problem/Plan - 2:  ·  Problem: Seizures.  Plan: -Patient with history of meningioma resected 25 years ago, then again 1 year ago complicated by meningitis that required surgery as well.  Patient takes keppra 500mg BID at home for seizures.  Starting to have memory loss issues, unclear if the two are related.  Patient endorses that she does not take keppra at night usually, only when her family is around and makes her.  -Continue with keppra 500mg BID  -VEEG monitoring.     Problem/Plan - 3:  ·  Problem: Diabetes.  Plan: -Patient with history of diabetes mellitus, takes metformin 500mg BID at home as well as farxiga 5mg daily.  -Continue with insulin sliding scale  -Continue with consistent carbohydrate diet.     Problem/Plan - 4:  ·  Problem: Hyperlipidemia.  Plan: -Patient with history of hyperlipidemia, continue with home dose of lipitor 20mg daily.     Problem/Plan - 5:  ·  Problem: Need for prophylactic measure.  Plan: -HSQ  -No GI prophylaxis indicated.

## 2019-05-14 NOTE — PROGRESS NOTE ADULT - SUBJECTIVE AND OBJECTIVE BOX
OVERNIGHT EVENTS: Admitted overnight    SUBJECTIVE / INTERVAL HPI: Patient seen and examined at bedside. Patient states she doesnt feel forgetful. She has no headaches, dizziness, numbness, weakness, sensation changes. She denies f/c, cp, sob, abd pain, n/v, d/c, dysuria.     VITAL SIGNS:  Vital Signs Last 24 Hrs  T(C): 36.4 (14 May 2019 11:15), Max: 36.9 (14 May 2019 07:30)  T(F): 97.5 (14 May 2019 11:15), Max: 98.5 (14 May 2019 07:30)  HR: 91 (14 May 2019 11:15) (76 - 96)  BP: 130/84 (14 May 2019 11:15) (97/60 - 134/87)  BP(mean): --  RR: 16 (14 May 2019 11:15) (14 - 16)  SpO2: 95% (14 May 2019 11:15) (94% - 99%)    PHYSICAL EXAM:    General: NAD, lying comfortably in bed  Cardiovascular: S1, S2 normal; RRR, no M/G/R  Respiratory: CTABL; no W/R/R  Gastrointestinal: soft, nontender, nondistended. bowel sounds present.  Skin: no ulcerations or visible rashes appreciated  Extremities: no DEON  Neurological: AAOx3; no focal deficits. CNs all intact. strength 5/5 throughout    MEDICATIONS:  MEDICATIONS  (STANDING):  atorvastatin 20 milliGRAM(s) Oral at bedtime  dextrose 5%. 1000 milliLiter(s) (50 mL/Hr) IV Continuous <Continuous>  dextrose 50% Injectable 12.5 Gram(s) IV Push once  dextrose 50% Injectable 25 Gram(s) IV Push once  dextrose 50% Injectable 25 Gram(s) IV Push once  heparin  Injectable 5000 Unit(s) SubCutaneous every 12 hours  insulin lispro (HumaLOG) corrective regimen sliding scale   SubCutaneous Before meals and at bedtime  levETIRAcetam 500 milliGRAM(s) Oral two times a day    MEDICATIONS  (PRN):  dextrose 40% Gel 15 Gram(s) Oral once PRN Blood Glucose LESS THAN 70 milliGRAM(s)/deciliter  glucagon  Injectable 1 milliGRAM(s) IntraMuscular once PRN Glucose LESS THAN 70 milligrams/deciliter      ALLERGIES:  Allergies    No Known Allergies    Intolerances        LABS:                        11.2   6.65  )-----------( 279      ( 14 May 2019 06:18 )             34.1     05-14    144  |  109<H>  |  10  ----------------------------<  116<H>  3.8   |  25  |  0.64    Ca    9.4      14 May 2019 06:18  Mg     1.9     05-14    TPro  6.4  /  Alb  4.1  /  TBili  0.3  /  DBili  x   /  AST  17  /  ALT  19  /  AlkPhos  81  05-13    PT/INR - ( 13 May 2019 16:52 )   PT: 11.5 sec;   INR: 1.02          PTT - ( 13 May 2019 16:52 )  PTT:28.2 sec    CAPILLARY BLOOD GLUCOSE      POCT Blood Glucose.: 142 mg/dL (14 May 2019 12:20)      RADIOLOGY & ADDITIONAL TESTS: Reviewed.

## 2019-05-14 NOTE — PROGRESS NOTE ADULT - ATTENDING COMMENTS
Patient seen and examined with Resident.  Agree with above.  Her daughter at bedside.  Patient agrees that she didn't remember some parts of the past few days.    No obvious tongue biting, shaking, urine or bowel incontinence.  She hasn't been compliant with her nighttime medications since they're big pills.    Plan:   1) Workup -   - vEEG in place  - MRI Brain without and with adebayo to evaluate brain given history of meningioma surgeries (last one year ago in [place])  - Consider Neuropsychological evaluation given her flat affect and responses that could correlate with her frontal surgeries    2) Medications -   - Restart Keppra 500mg BID (her home dose)    3) DVT prophylaxis - Heparin SQ TID    Rest of medical plan as per her PMD, Dr. Caputo

## 2019-05-14 NOTE — PROGRESS NOTE ADULT - PROBLEM SELECTOR PLAN 1
According to the son at bedside, the patient lost two days of memory.  She spent time with her family on Saturday and Sunday but on Sunday evening, did not remember who she spent time with.  She also forgot that her  recently had surgery and also did not know the day of the week.  At baseline the patient never has problems with memory and is usually very sharp.   -On exam today pt fulling oriented. AAOx3. no focal neuro deficits on exam.   -Per patient, not compliant with keppra at night, only takes it when her family is around to tell her, could be component of seizures, continue with keppra 500mg BID and VEEG monitoring  -Follow up B12, TSH, RPR, folate   -f/u MRI brain w and w/o con  -will consider neuropsych eval According to the son at bedside, the patient lost two days of memory.  She spent time with her family on Saturday and Sunday but on Sunday evening, did not remember who she spent time with.  She also forgot that her  recently had surgery and also did not know the day of the week.  At baseline the patient never has problems with memory and is usually very sharp.   -On exam today pt fulling oriented. AAOx3. no focal neuro deficits on exam.   -Per patient, not compliant with keppra at night, only takes it when her family is around to tell her, could be component of seizures, continue with keppra 500mg BID and VEEG monitoring  - B12, TSH, folate all wnl  -f/u RPR  -f/u keppra blood level  -f/u MRI brain w and w/o con  -will consider neuropsych eval

## 2019-05-14 NOTE — PHYSICAL THERAPY INITIAL EVALUATION ADULT - ADDITIONAL COMMENTS
independent with all mobility and ADLs, owns walker which she uses for longer community distances only, otherwise no Assistive Device

## 2019-05-14 NOTE — PROGRESS NOTE ADULT - PROBLEM SELECTOR PLAN 2
History of meningioma resected 25 years ago, then again 1 year ago complicated by meningitis that required surgery as well.  Patient takes keppra 500mg BID at home for seizures.  Starting to have memory loss issues, unclear if the two are related.  Patient endorses that she does not take keppra at night usually, only when her family is around and makes her.  -Continue with keppra 500mg BID  -f/u VEEG monitoring History of meningioma resected 25 years ago, then again 1 year ago complicated by meningitis that required surgery as well.  Patient takes keppra 500mg BID at home for seizures.  Starting to have memory loss issues, unclear if the two are related.  Patient endorses that she does not take keppra at night usually, only when her family is around and makes her.  -Continue with keppra 500mg BID  -f/u VEEG monitoring  -f/u keppra blood level

## 2019-05-15 DIAGNOSIS — D32.9 BENIGN NEOPLASM OF MENINGES, UNSPECIFIED: ICD-10-CM

## 2019-05-15 LAB
ANION GAP SERPL CALC-SCNC: 12 MMOL/L — SIGNIFICANT CHANGE UP (ref 5–17)
BUN SERPL-MCNC: 10 MG/DL — SIGNIFICANT CHANGE UP (ref 7–23)
CALCIUM SERPL-MCNC: 9.4 MG/DL — SIGNIFICANT CHANGE UP (ref 8.4–10.5)
CHLORIDE SERPL-SCNC: 107 MMOL/L — SIGNIFICANT CHANGE UP (ref 96–108)
CO2 SERPL-SCNC: 23 MMOL/L — SIGNIFICANT CHANGE UP (ref 22–31)
CREAT SERPL-MCNC: 0.55 MG/DL — SIGNIFICANT CHANGE UP (ref 0.5–1.3)
GLUCOSE BLDC GLUCOMTR-MCNC: 108 MG/DL — HIGH (ref 70–99)
GLUCOSE BLDC GLUCOMTR-MCNC: 127 MG/DL — HIGH (ref 70–99)
GLUCOSE BLDC GLUCOMTR-MCNC: 158 MG/DL — HIGH (ref 70–99)
GLUCOSE BLDC GLUCOMTR-MCNC: 187 MG/DL — HIGH (ref 70–99)
GLUCOSE SERPL-MCNC: 134 MG/DL — HIGH (ref 70–99)
HCT VFR BLD CALC: 34.9 % — SIGNIFICANT CHANGE UP (ref 34.5–45)
HGB BLD-MCNC: 11.3 G/DL — LOW (ref 11.5–15.5)
MAGNESIUM SERPL-MCNC: 1.9 MG/DL — SIGNIFICANT CHANGE UP (ref 1.6–2.6)
MCHC RBC-ENTMCNC: 29.7 PG — SIGNIFICANT CHANGE UP (ref 27–34)
MCHC RBC-ENTMCNC: 32.4 GM/DL — SIGNIFICANT CHANGE UP (ref 32–36)
MCV RBC AUTO: 91.6 FL — SIGNIFICANT CHANGE UP (ref 80–100)
NRBC # BLD: 0 /100 WBCS — SIGNIFICANT CHANGE UP (ref 0–0)
PLATELET # BLD AUTO: 295 K/UL — SIGNIFICANT CHANGE UP (ref 150–400)
POTASSIUM SERPL-MCNC: 3.8 MMOL/L — SIGNIFICANT CHANGE UP (ref 3.5–5.3)
POTASSIUM SERPL-SCNC: 3.8 MMOL/L — SIGNIFICANT CHANGE UP (ref 3.5–5.3)
RBC # BLD: 3.81 M/UL — SIGNIFICANT CHANGE UP (ref 3.8–5.2)
RBC # FLD: 13.6 % — SIGNIFICANT CHANGE UP (ref 10.3–14.5)
SODIUM SERPL-SCNC: 142 MMOL/L — SIGNIFICANT CHANGE UP (ref 135–145)
WBC # BLD: 6.13 K/UL — SIGNIFICANT CHANGE UP (ref 3.8–10.5)
WBC # FLD AUTO: 6.13 K/UL — SIGNIFICANT CHANGE UP (ref 3.8–10.5)

## 2019-05-15 PROCEDURE — 99233 SBSQ HOSP IP/OBS HIGH 50: CPT

## 2019-05-15 PROCEDURE — 70553 MRI BRAIN STEM W/O & W/DYE: CPT | Mod: 26

## 2019-05-15 PROCEDURE — 99232 SBSQ HOSP IP/OBS MODERATE 35: CPT

## 2019-05-15 RX ORDER — POTASSIUM CHLORIDE 20 MEQ
20 PACKET (EA) ORAL ONCE
Refills: 0 | Status: COMPLETED | OUTPATIENT
Start: 2019-05-15 | End: 2019-05-15

## 2019-05-15 RX ORDER — MAGNESIUM SULFATE 500 MG/ML
1 VIAL (ML) INJECTION ONCE
Refills: 0 | Status: COMPLETED | OUTPATIENT
Start: 2019-05-15 | End: 2019-05-15

## 2019-05-15 RX ADMIN — Medication 2: at 17:48

## 2019-05-15 RX ADMIN — HEPARIN SODIUM 5000 UNIT(S): 5000 INJECTION INTRAVENOUS; SUBCUTANEOUS at 17:48

## 2019-05-15 RX ADMIN — Medication 2: at 12:22

## 2019-05-15 RX ADMIN — LEVETIRACETAM 500 MILLIGRAM(S): 250 TABLET, FILM COATED ORAL at 11:04

## 2019-05-15 RX ADMIN — HEPARIN SODIUM 5000 UNIT(S): 5000 INJECTION INTRAVENOUS; SUBCUTANEOUS at 07:02

## 2019-05-15 RX ADMIN — ATORVASTATIN CALCIUM 20 MILLIGRAM(S): 80 TABLET, FILM COATED ORAL at 21:39

## 2019-05-15 RX ADMIN — Medication 20 MILLIEQUIVALENT(S): at 10:58

## 2019-05-15 RX ADMIN — LEVETIRACETAM 500 MILLIGRAM(S): 250 TABLET, FILM COATED ORAL at 21:39

## 2019-05-15 RX ADMIN — Medication 100 GRAM(S): at 10:58

## 2019-05-15 NOTE — PROGRESS NOTE ADULT - PROBLEM SELECTOR PLAN 1
According to the son at bedside, the patient lost two days of memory.  She spent time with her family on Saturday and Sunday but on Sunday evening, did not remember who she spent time with.  She also forgot that her  recently had surgery and also did not know the day of the week.  At baseline the patient never has problems with memory and is usually very sharp.   -On exam today pt fulling oriented. AAOx3. no focal neuro deficits on exam.   -Per patient, not compliant with keppra at night, only takes it when her family is around to tell her, could be component of seizures, continue with keppra 500mg BID and VEEG monitoring  - B12, TSH, folate all wnl  -RPR negative  -f/u keppra blood level  -f/u MRI brain w and w/o con  -will consider neuropsych eval

## 2019-05-15 NOTE — PROGRESS NOTE ADULT - ASSESSMENT
68 year old female with PMH DM, HLD, seizures, meningioma resected 25 years ago and ago 1 year ago, second operation complicated by meningitis which also required surgery who presents with 2 days of amnesia.
68 year old female with PMH DM, HLD, seizures, meningioma resected 25 years ago and ago 1 year ago, second operation complicated by meningitis which also required surgery who presents with 2 days of amnesia.     Problem/Plan - 1:  ·  Problem: Memory loss.  Plan: According to the son at bedside, the patient lost two days of memory.  She spent time with her family on Saturday and Sunday but on Sunday evening, did not remember who she spent time with.  She also forgot that her  recently had surgery and also did not know the day of the week.  At baseline the patient never has problems with memory and is usually very sharp.   -On exam today pt fulling oriented. AAOx3. no focal neuro deficits on exam.   -Per patient, not compliant with keppra at night, only takes it when her family is around to tell her, could be component of seizures, continue with keppra 500mg BID and VEEG monitoring  - B12, TSH, folate all wnl  -RPR negative  -f/u keppra blood level  -f/u MRI brain w and w/o con  -will consider neuropsych eval.     Problem/Plan - 2:  ·  Problem: Seizures.  Plan: History of meningioma resected 25 years ago, then again 1 year ago complicated by meningitis that required surgery as well.  Patient takes keppra 500mg BID at home for seizures.  Starting to have memory loss issues, unclear if the two are related.  Patient endorses that she does not take keppra at night usually, only when her family is around and makes her.  -Continue with keppra 500mg BID  -f/u VEEG monitoring  -f/u keppra blood level.     Problem/Plan - 3:  ·  Problem: Diabetes.  Plan: -Patient with history of diabetes mellitus, takes metformin 500mg BID at home as well as farxiga 5mg daily.  -Continue with insulin sliding scale  -Continue with consistent carbohydrate diet.     Problem/Plan - 4:  ·  Problem: Hyperlipidemia.  Plan: -Patient with history of hyperlipidemia, continue with home dose of lipitor 20mg daily.     Problem/Plan - 5:  ·  Problem: Need for prophylactic measure.  Plan: -HSQ  -No GI prophylaxis indicated.     Problem/Plan - 6:  Problem: Nutrition, metabolism, and development symptoms. Plan: -No IVF indicated  -Will replete to K>4 and Mg>2  -Consistent carb kosher diet
pt examined and discussed with staff and dr dunlap.   eeg report awaitied, and mri ordered.   will reequest f/u by dr griffin, which family has arranged for early next week [place].   suspect pt may have been post-ictal during the missing memory periods, and have stressed vigorously the importance of med compliance and med f/u.   reviewed with all, including pt's dtr.
68 year old female with PMH DM, HLD, seizures, meningioma resected 25 years ago and ago 1 year ago, second operation complicated by meningitis which also required surgery who presents with 2 days of amnesia.

## 2019-05-15 NOTE — PROGRESS NOTE ADULT - SUBJECTIVE AND OBJECTIVE BOX
CC: AMNESIA      INTERVAL HISTORY:  67 yo lady with past h/o meningioma resection, blindness left eye, recurrent meningioma resection, then reexploration and meningitis, now adm with amnesia over parts of last days.   pt reports she was variably noncompliant with monica dose keppra.  now alert, awake, and has reformed much of the missing memory.         ROS: No chest pain. No shortness of breath. No nausea.    PAST MEDICAL & SURGICAL HISTORY:  Hyperlipidemia  Diabetes  Seizures  Meningioma  S/P resection of meningioma      PHYSICAL EXAM:  T(C): 36.7 (05-15-19 @ 11:08), Max: 36.7 (05-15-19 @ 05:42)  HR: 82 (05-15-19 @ 11:08)  BP: 114/59 (05-15-19 @ 11:08) (114/59 - 119/63)  RR: 16 (05-15-19 @ 11:08)  SpO2: 96% (05-15-19 @ 11:08)  Wt(kg): --  I&O's Summary    Weight 47.3 (05-13 @ 22:00)    Appearance: alert, pleasant, INAD.  poor dentition.  ENT: oral mucosa moist, no pallor/cyanosis.  Neck: no JVD visible.  Cardiac: no rubs. no murmurs. Extremities: NO EDEMA.  Skin: no rashes.  Extremities (digits): no clubbing or cyanosis.  Respratory effort: no access muscle use. Lungs: CLEAR TO AUSCULTATION.  Abdomen: soft. nontender. no masses.  Psych affect: not depressed. Orientation: person, place, situation.     MEDICATIONS  (STANDING):  atorvastatin 20 milliGRAM(s) Oral at bedtime  dextrose 5%. 1000 milliLiter(s) (50 mL/Hr) IV Continuous <Continuous>  dextrose 50% Injectable 12.5 Gram(s) IV Push once  dextrose 50% Injectable 25 Gram(s) IV Push once  dextrose 50% Injectable 25 Gram(s) IV Push once  heparin  Injectable 5000 Unit(s) SubCutaneous every 12 hours  insulin lispro (HumaLOG) corrective regimen sliding scale   SubCutaneous Before meals and at bedtime  levETIRAcetam 500 milliGRAM(s) Oral two times a day    MEDICATIONS  (PRN):  dextrose 40% Gel 15 Gram(s) Oral once PRN Blood Glucose LESS THAN 70 milliGRAM(s)/deciliter  glucagon  Injectable 1 milliGRAM(s) IntraMuscular once PRN Glucose LESS THAN 70 milligrams/deciliter      DATA:  142    |  107    |  10     ----------------------------<  134<H>  Ca:9.4   (15 May 2019 07:06)  3.8     |  23     |  0.55       eGFR if Non : 95  eGFR if : 110    TPro  6.4    /  Alb  4.1    /  TBili  0.3    /  DBili  x      /  AST  17     /  ALT  19     /  AlkPhos  81     13 May 2019 16:52                        11.3<L>  6.13  )-----------( 295      ( 15 May 2019 07:05 )             34.9

## 2019-05-15 NOTE — PROGRESS NOTE ADULT - PROBLEM SELECTOR PLAN 6
-No IVF indicated  -Will replete to K>4 and Mg>2  -Consistent carb kosher diet  -Dispo RMF
-No IVF indicated  -Will replete to K>4 and Mg>2  -Consistent carb kosher diet  -Dispo RMF

## 2019-05-15 NOTE — PROGRESS NOTE ADULT - PROBLEM SELECTOR PLAN 4
-Patient with history of hyperlipidemia, continue with home dose of lipitor 20mg daily
-Patient with history of hyperlipidemia, continue with home dose of lipitor 20mg daily

## 2019-05-15 NOTE — PROGRESS NOTE ADULT - SUBJECTIVE AND OBJECTIVE BOX
OVERNIGHT EVENTS:     SUBJECTIVE / INTERVAL HPI: Patient seen and examined at bedside.     VITAL SIGNS:  Vital Signs Last 24 Hrs  T(C): 36.7 (15 May 2019 11:08), Max: 36.8 (14 May 2019 20:36)  T(F): 98 (15 May 2019 11:08), Max: 98.3 (14 May 2019 20:36)  HR: 82 (15 May 2019 11:08) (76 - 86)  BP: 114/59 (15 May 2019 11:08) (114/59 - 119/63)  BP(mean): --  RR: 16 (15 May 2019 11:08) (16 - 16)  SpO2: 96% (15 May 2019 11:08) (95% - 97%)    PHYSICAL EXAM:    General: NAD  Cardiovascular: S1, S2 normal; RRR, no M/G/R  Respiratory: CTABL; no W/R/R  Gastrointestinal: soft, nontender, nondistended. bowel sounds present.  Skin: no ulcerations or visible rashes appreciated  Extremities: no DEON  Neurological: AAOx3; no focal deficits    MEDICATIONS:  MEDICATIONS  (STANDING):  atorvastatin 20 milliGRAM(s) Oral at bedtime  dextrose 5%. 1000 milliLiter(s) (50 mL/Hr) IV Continuous <Continuous>  dextrose 50% Injectable 12.5 Gram(s) IV Push once  dextrose 50% Injectable 25 Gram(s) IV Push once  dextrose 50% Injectable 25 Gram(s) IV Push once  heparin  Injectable 5000 Unit(s) SubCutaneous every 12 hours  insulin lispro (HumaLOG) corrective regimen sliding scale   SubCutaneous Before meals and at bedtime  levETIRAcetam 500 milliGRAM(s) Oral two times a day    MEDICATIONS  (PRN):  dextrose 40% Gel 15 Gram(s) Oral once PRN Blood Glucose LESS THAN 70 milliGRAM(s)/deciliter  glucagon  Injectable 1 milliGRAM(s) IntraMuscular once PRN Glucose LESS THAN 70 milligrams/deciliter      ALLERGIES:  Allergies    No Known Allergies    Intolerances        LABS:                        11.3   6.13  )-----------( 295      ( 15 May 2019 07:05 )             34.9     05-15    142  |  107  |  10  ----------------------------<  134<H>  3.8   |  23  |  0.55    Ca    9.4      15 May 2019 07:06  Mg     1.9     05-15    TPro  6.4  /  Alb  4.1  /  TBili  0.3  /  DBili  x   /  AST  17  /  ALT  19  /  AlkPhos  81  05-13    PT/INR - ( 13 May 2019 16:52 )   PT: 11.5 sec;   INR: 1.02          PTT - ( 13 May 2019 16:52 )  PTT:28.2 sec    CAPILLARY BLOOD GLUCOSE      POCT Blood Glucose.: 127 mg/dL (15 May 2019 08:15)      RADIOLOGY & ADDITIONAL TESTS: Reviewed. OVERNIGHT EVENTS: MARTIN     SUBJECTIVE / INTERVAL HPI: Patient seen and examined at bedside. No complaints today. wants to go home    VITAL SIGNS:  Vital Signs Last 24 Hrs  T(C): 36.7 (15 May 2019 11:08), Max: 36.8 (14 May 2019 20:36)  T(F): 98 (15 May 2019 11:08), Max: 98.3 (14 May 2019 20:36)  HR: 82 (15 May 2019 11:08) (76 - 86)  BP: 114/59 (15 May 2019 11:08) (114/59 - 119/63)  BP(mean): --  RR: 16 (15 May 2019 11:08) (16 - 16)  SpO2: 96% (15 May 2019 11:08) (95% - 97%)    PHYSICAL EXAM:    General: NAD, lying comfortably in bed   Cardiovascular: S1, S2 normal; RRR, no M/G/R  Respiratory: CTABL; no W/R/R  Gastrointestinal: soft, nontender, nondistended. bowel sounds present.  Skin: no ulcerations or visible rashes appreciated  Extremities: no DEON  Neurological: AAOx3    MEDICATIONS:  MEDICATIONS  (STANDING):  atorvastatin 20 milliGRAM(s) Oral at bedtime  dextrose 5%. 1000 milliLiter(s) (50 mL/Hr) IV Continuous <Continuous>  dextrose 50% Injectable 12.5 Gram(s) IV Push once  dextrose 50% Injectable 25 Gram(s) IV Push once  dextrose 50% Injectable 25 Gram(s) IV Push once  heparin  Injectable 5000 Unit(s) SubCutaneous every 12 hours  insulin lispro (HumaLOG) corrective regimen sliding scale   SubCutaneous Before meals and at bedtime  levETIRAcetam 500 milliGRAM(s) Oral two times a day    MEDICATIONS  (PRN):  dextrose 40% Gel 15 Gram(s) Oral once PRN Blood Glucose LESS THAN 70 milliGRAM(s)/deciliter  glucagon  Injectable 1 milliGRAM(s) IntraMuscular once PRN Glucose LESS THAN 70 milligrams/deciliter      ALLERGIES:  Allergies    No Known Allergies    Intolerances        LABS:                        11.3   6.13  )-----------( 295      ( 15 May 2019 07:05 )             34.9     05-15    142  |  107  |  10  ----------------------------<  134<H>  3.8   |  23  |  0.55    Ca    9.4      15 May 2019 07:06  Mg     1.9     05-15    TPro  6.4  /  Alb  4.1  /  TBili  0.3  /  DBili  x   /  AST  17  /  ALT  19  /  AlkPhos  81  05-13    PT/INR - ( 13 May 2019 16:52 )   PT: 11.5 sec;   INR: 1.02          PTT - ( 13 May 2019 16:52 )  PTT:28.2 sec    CAPILLARY BLOOD GLUCOSE      POCT Blood Glucose.: 127 mg/dL (15 May 2019 08:15)      RADIOLOGY & ADDITIONAL TESTS: Reviewed. OVERNIGHT EVENTS: MARTIN     SUBJECTIVE / INTERVAL HPI: Patient seen and examined at bedside. No complaints today. wants to go home    VITAL SIGNS:  Vital Signs Last 24 Hrs  T(C): 36.7 (15 May 2019 11:08), Max: 36.8 (14 May 2019 20:36)  T(F): 98 (15 May 2019 11:08), Max: 98.3 (14 May 2019 20:36)  HR: 82 (15 May 2019 11:08) (76 - 86)  BP: 114/59 (15 May 2019 11:08) (114/59 - 119/63)  RR: 16 (15 May 2019 11:08) (16 - 16)  SpO2: 96% (15 May 2019 11:08) (95% - 97%)    PHYSICAL EXAM:    General: NAD, lying comfortably in bed   Cardiovascular: S1, S2 normal; RRR, no M/G/R  Respiratory: CTABL; no W/R/R  Gastrointestinal: soft, nontender, nondistended. bowel sounds present.  Skin: no ulcerations or visible rashes appreciated  Extremities: no DEON  Neurological: AAOx3    MEDICATIONS:  MEDICATIONS  (STANDING):  atorvastatin 20 milliGRAM(s) Oral at bedtime  dextrose 5%. 1000 milliLiter(s) (50 mL/Hr) IV Continuous <Continuous>  dextrose 50% Injectable 12.5 Gram(s) IV Push once  dextrose 50% Injectable 25 Gram(s) IV Push once  dextrose 50% Injectable 25 Gram(s) IV Push once  heparin  Injectable 5000 Unit(s) SubCutaneous every 12 hours  insulin lispro (HumaLOG) corrective regimen sliding scale   SubCutaneous Before meals and at bedtime  levETIRAcetam 500 milliGRAM(s) Oral two times a day    MEDICATIONS  (PRN):  dextrose 40% Gel 15 Gram(s) Oral once PRN Blood Glucose LESS THAN 70 milliGRAM(s)/deciliter  glucagon  Injectable 1 milliGRAM(s) IntraMuscular once PRN Glucose LESS THAN 70 milligrams/deciliter      ALLERGIES:  Allergies    No Known Allergies    LABS:                        11.3   6.13  )-----------( 295      ( 15 May 2019 07:05 )             34.9     05-15    142  |  107  |  10  ----------------------------<  134<H>  3.8   |  23  |  0.55    Ca    9.4      15 May 2019 07:06  Mg     1.9     05-15    TPro  6.4  /  Alb  4.1  /  TBili  0.3  /  DBili  x   /  AST  17  /  ALT  19  /  AlkPhos  81  05-13    PT/INR - ( 13 May 2019 16:52 )   PT: 11.5 sec;   INR: 1.02     PTT - ( 13 May 2019 16:52 )  PTT:28.2 sec    CAPILLARY BLOOD GLUCOSE  POCT Blood Glucose.: 127 mg/dL (15 May 2019 08:15)      RADIOLOGY & ADDITIONAL TESTS: Reviewed.

## 2019-05-15 NOTE — PROGRESS NOTE ADULT - SUBJECTIVE AND OBJECTIVE BOX
Physical Medicine and Rehabilitation Progress Note:    Patient is a 68y old  Female who presents with a chief complaint of Amnesia (15 May 2019 11:15)      HPI:  68 year old female with PMH DM, HLD, seizures, L eye blindness, meningioma resected 25 years ago and again 1 year ago, (second operation complicated by meningitis which also required surgery) who presents with 2 days of amnesia.  According to the son at bedside, the patient lost two days of memory.  She spent time with her family on Saturday and Sunday but on Sunday evening, did not remember who she spent time with.  She also forgot that her  recently had surgery or the day of the week.  According to her son, at baseline the patient never has problems with memory and is usually very sharp.  Her family was instructed to bring her to Dr. Caputo today who sent her to the ED for further evaluation.  Upon arrival to the ED, vital signs were /87, HR 92, RR 16, temperature 97.8 degrees and saturating 96% on room air.  CT head showed no acute intracranial hemorrhage, transcortical infarction or mass effect but did show encephalomalacic changes in the frontal lobes.  Patient was admitted for further workup of forgetfulness. (13 May 2019 22:26)                            11.3   6.13  )-----------( 295      ( 15 May 2019 07:05 )             34.9       05-15    142  |  107  |  10  ----------------------------<  134<H>  3.8   |  23  |  0.55    Ca    9.4      15 May 2019 07:06  Mg     1.9     05-15    TPro  6.4  /  Alb  4.1  /  TBili  0.3  /  DBili  x   /  AST  17  /  ALT  19  /  AlkPhos  81  05-13    Vital Signs Last 24 Hrs  T(C): 36.7 (15 May 2019 11:08), Max: 36.8 (14 May 2019 20:36)  T(F): 98 (15 May 2019 11:08), Max: 98.3 (14 May 2019 20:36)  HR: 82 (15 May 2019 11:08) (76 - 86)  BP: 114/59 (15 May 2019 11:08) (114/59 - 119/63)  BP(mean): --  RR: 16 (15 May 2019 11:08) (16 - 16)  SpO2: 96% (15 May 2019 11:08) (95% - 97%)    MEDICATIONS  (STANDING):  atorvastatin 20 milliGRAM(s) Oral at bedtime  dextrose 5%. 1000 milliLiter(s) (50 mL/Hr) IV Continuous <Continuous>  dextrose 50% Injectable 12.5 Gram(s) IV Push once  dextrose 50% Injectable 25 Gram(s) IV Push once  dextrose 50% Injectable 25 Gram(s) IV Push once  heparin  Injectable 5000 Unit(s) SubCutaneous every 12 hours  insulin lispro (HumaLOG) corrective regimen sliding scale   SubCutaneous Before meals and at bedtime  levETIRAcetam 500 milliGRAM(s) Oral two times a day    MEDICATIONS  (PRN):  dextrose 40% Gel 15 Gram(s) Oral once PRN Blood Glucose LESS THAN 70 milliGRAM(s)/deciliter  glucagon  Injectable 1 milliGRAM(s) IntraMuscular once PRN Glucose LESS THAN 70 milligrams/deciliter    Currently Undergoing Physical Therapy at bedside.    Functional Status Assessment:    Previous Level of Function:     · Ambulation Skills	independent	  · Transfer Skills	independent	  · ADL Skills	independent	  · Work/Leisure Activity	independent	  · Additional Comments	independent with all mobility and ADLs, owns walker which she uses for longer community distances only, otherwise no Assistive Device	    Cognitive Status Examination:   · Orientation	oriented to person, place, time and situation	  · Level of Consciousness	alert	  · Follows Commands and Answers Questions	100% of the time	  · Personal Safety and Judgment	intact	  · Short Term Memory	intact	  · Long Term Memory	intact	    Range of Motion Exam:   · Range of Motion Examination	no ROM deficits were identified	    Manual Muscle Testing:   · Manual Muscle Testing Results	>3/5 functional mobility	    Bed Mobility: Rolling/Turning:     · Level of Bostic	supervision	  · Assistive Device	bed rails	    Bed Mobility: Scooting/Bridging:     · Level of Bostic	contact guard	  · Physical Assist/Nonphysical Assist	verbal cues; 1 person assist	  · Assistive Device	bed rails	    Bed Mobility: Supine to Sit:     · Level of Bostic	minimum assist (75% patients effort)	  · Physical Assist/Nonphysical Assist	verbal cues; 1 person assist	  · Assistive Device	bed rails	    Bed Mobility Analysis:     · Bed Mobility Limitations	decreased ability to use arms for pushing/pulling; decreased ability to use legs for bridging/pushing; impaired ability to control trunk for mobility	  · Impairments Contributing to Impaired Bed Mobility	impaired balance; decreased strength	    Transfer: Sit to Stand:     · Level of Bostic	contact guard	  · Physical Assist/Nonphysical Assist	verbal cues; 1 person assist	  · Weight-Bearing Restrictions	weight-bearing as tolerated	  · Assistive Device	handheld assist	    Transfer: Stand to Sit:     · Level of Bostic	contact guard	  · Physical Assist/Nonphysical Assist	verbal cues; 1 person assist	  · Weight-Bearing Restrictions	weight-bearing as tolerated	  · Assistive Device	handheld assist	    Sit/Stand Transfer Safety Analysis:     · Impairments Contributing to Impaired Transfers	impaired balance; decreased strength	    Gait Skills:     · Level of Bostic	contact guard	  · Physical Assist/Nonphysical Assist	verbal cues; 1 person assist	  · Weight-Bearing Restrictions	weight-bearing as tolerated	  · Assistive Device	handheld assist	  · Gait Distance	100 feet	    Gait Analysis:     · Gait Pattern Used	swing-through gait	  · Gait Deviations Noted	decreased adrianna; increased time in double stance; decreased step length; decreased stride length	  · Impairments Contributing to Gait Deviations	impaired balance; decreased strength	    Stair Negotiation:     · Level of Bostic	To Be Assessed	    Balance Skills Assessment:     · Sitting Balance: Static	good balance	  · Sitting Balance: Dynamic	fair balance	  · Sit-to-Stand Balance	fair balance	  · Standing Balance: Static	fair balance	  · Standing Balance: Dynamic	fair minus	  · Identified Impairments Contributing to Balance Disturbance	decreased strength	    Sensory Examination:   Sensory Examination:    Grossly Intact:   · Gross Sensory Examination	Grossly Intact	      Clinical Impressions:   · Criteria for Skilled Therapeutic Interventions	impairments found; functional limitations in following categories	  · Impairments Found (describe specific impairments)	aerobic capacity/endurance; gait, locomotion, and balance	  · Functional Limitations in Following Categories (describe specific limitations)	self-care; home management; community/leisure	  · Rehab Potential	good, to achieve stated therapy goals	  · Therapy Frequency	2-3x/week	  · Anticipated Equipment Needs at Discharge	none, owns rolling walker	        PM&R Impression: as above    Disposition Plan Recommendations: d/c home with home physical therapy

## 2019-05-15 NOTE — PROGRESS NOTE ADULT - ATTENDING COMMENTS
Patient seen and examined with Resident.  Agree with above.  Her daughter at bedside.  She's more awake and aware today than yesterday.  She's able to discuss the book that she's reading.  She can name, repeat, follow commands.  No specific weakness or numbness.    vEEG (Prelim) - slowing with some spikes/excitability but no seizure activity.    - Event still seems most consistent with post-ictal period due to noncompliance.    Plan:   1) Workup -   - MRI Brain without and with adebayo to evaluate brain given history of meningioma surgeries (last one year ago in [place])  - Consider Neuropsychological evaluation given her flat affect and responses that could correlate with her frontal surgeries.  Can be done as outpatient.    2) Medications -   - Continue Keppra 500mg BID - Emphasized importance of compliance with her Keppra    3) DVT prophylaxis - Heparin SQ TID    Rest of medical plan as per her PMD, Dr. Caputo

## 2019-05-15 NOTE — PROGRESS NOTE ADULT - PROBLEM SELECTOR PLAN 2
History of meningioma resected 25 years ago, then again 1 year ago complicated by meningitis that required surgery as well.  Patient takes keppra 500mg BID at home for seizures.  Starting to have memory loss issues, unclear if the two are related.  Patient endorses that she does not take keppra at night usually, only when her family is around and makes her.  -Continue with keppra 500mg BID  -f/u VEEG monitoring  -f/u keppra blood level

## 2019-05-15 NOTE — PROGRESS NOTE ADULT - PROBLEM SELECTOR PLAN 3
-Patient with history of diabetes mellitus, takes metformin 500mg BID at home as well as farxiga 5mg daily.  -Continue with insulin sliding scale  -Continue with consistent carbohydrate diet
-Patient with history of diabetes mellitus, takes metformin 500mg BID at home as well as farxiga 5mg daily.  -Continue with insulin sliding scale  -Continue with consistent carbohydrate diet

## 2019-05-15 NOTE — PROGRESS NOTE ADULT - PROBLEM SELECTOR PLAN 7
-PCP Contacted on Admission: (Y/N) --> Name & Phone #: Dr. Caputo [phone]  -Date of Contact with PCP:  -PCP Contacted at Discharge: (Y/N, N/A)  -Summary of Handoff Given to PCP:   -Post-Discharge Appointment Date and Location:
-PCP Contacted on Admission: (Y/N) --> Name & Phone #: Dr. Caputo [phone]  -Date of Contact with PCP:  -PCP Contacted at Discharge: (Y/N, N/A)  -Summary of Handoff Given to PCP:   -Post-Discharge Appointment Date and Location:

## 2019-05-16 ENCOUNTER — APPOINTMENT (OUTPATIENT)
Dept: NEPHROLOGY | Facility: CLINIC | Age: 69
End: 2019-05-16

## 2019-05-16 ENCOUNTER — INBOUND DOCUMENT (OUTPATIENT)
Age: 69
End: 2019-05-16

## 2019-05-16 ENCOUNTER — TRANSCRIPTION ENCOUNTER (OUTPATIENT)
Age: 69
End: 2019-05-16

## 2019-05-16 VITALS
DIASTOLIC BLOOD PRESSURE: 78 MMHG | HEART RATE: 80 BPM | RESPIRATION RATE: 16 BRPM | SYSTOLIC BLOOD PRESSURE: 125 MMHG | OXYGEN SATURATION: 98 % | TEMPERATURE: 98 F

## 2019-05-16 LAB
GLUCOSE BLDC GLUCOMTR-MCNC: 146 MG/DL — HIGH (ref 70–99)
GLUCOSE BLDC GLUCOMTR-MCNC: 271 MG/DL — HIGH (ref 70–99)

## 2019-05-16 PROCEDURE — 70553 MRI BRAIN STEM W/O & W/DYE: CPT

## 2019-05-16 PROCEDURE — 85730 THROMBOPLASTIN TIME PARTIAL: CPT

## 2019-05-16 PROCEDURE — 84443 ASSAY THYROID STIM HORMONE: CPT

## 2019-05-16 PROCEDURE — 85027 COMPLETE CBC AUTOMATED: CPT

## 2019-05-16 PROCEDURE — 82607 VITAMIN B-12: CPT

## 2019-05-16 PROCEDURE — 80053 COMPREHEN METABOLIC PANEL: CPT

## 2019-05-16 PROCEDURE — 83735 ASSAY OF MAGNESIUM: CPT

## 2019-05-16 PROCEDURE — 85025 COMPLETE CBC W/AUTO DIFF WBC: CPT

## 2019-05-16 PROCEDURE — 83036 HEMOGLOBIN GLYCOSYLATED A1C: CPT

## 2019-05-16 PROCEDURE — 99285 EMERGENCY DEPT VISIT HI MDM: CPT

## 2019-05-16 PROCEDURE — 97116 GAIT TRAINING THERAPY: CPT

## 2019-05-16 PROCEDURE — 86140 C-REACTIVE PROTEIN: CPT

## 2019-05-16 PROCEDURE — 80177 DRUG SCRN QUAN LEVETIRACETAM: CPT

## 2019-05-16 PROCEDURE — 95951: CPT

## 2019-05-16 PROCEDURE — 70450 CT HEAD/BRAIN W/O DYE: CPT

## 2019-05-16 PROCEDURE — 86803 HEPATITIS C AB TEST: CPT

## 2019-05-16 PROCEDURE — 97161 PT EVAL LOW COMPLEX 20 MIN: CPT

## 2019-05-16 PROCEDURE — 36415 COLL VENOUS BLD VENIPUNCTURE: CPT

## 2019-05-16 PROCEDURE — 85610 PROTHROMBIN TIME: CPT

## 2019-05-16 PROCEDURE — 85652 RBC SED RATE AUTOMATED: CPT

## 2019-05-16 PROCEDURE — 82962 GLUCOSE BLOOD TEST: CPT

## 2019-05-16 PROCEDURE — 80048 BASIC METABOLIC PNL TOTAL CA: CPT

## 2019-05-16 PROCEDURE — A9585: CPT

## 2019-05-16 PROCEDURE — 99238 HOSP IP/OBS DSCHRG MGMT 30/<: CPT

## 2019-05-16 PROCEDURE — 86780 TREPONEMA PALLIDUM: CPT

## 2019-05-16 PROCEDURE — 82746 ASSAY OF FOLIC ACID SERUM: CPT

## 2019-05-16 RX ORDER — LEVETIRACETAM 250 MG/1
1 TABLET, FILM COATED ORAL
Qty: 60 | Refills: 0
Start: 2019-05-16 | End: 2019-06-14

## 2019-05-16 RX ORDER — LEVETIRACETAM 250 MG/1
1 TABLET, FILM COATED ORAL
Qty: 0 | Refills: 0 | DISCHARGE

## 2019-05-16 RX ORDER — LEVETIRACETAM 250 MG/1
1 TABLET, FILM COATED ORAL
Qty: 0 | Refills: 0 | DISCHARGE
Start: 2019-05-16

## 2019-05-16 RX ADMIN — Medication 6: at 12:12

## 2019-05-16 RX ADMIN — LEVETIRACETAM 500 MILLIGRAM(S): 250 TABLET, FILM COATED ORAL at 10:09

## 2019-05-16 RX ADMIN — HEPARIN SODIUM 5000 UNIT(S): 5000 INJECTION INTRAVENOUS; SUBCUTANEOUS at 07:11

## 2019-05-16 NOTE — DISCHARGE NOTE PROVIDER - CARE PROVIDERS DIRECT ADDRESSES
,[email or web address] ,[email or web address]

## 2019-05-16 NOTE — DISCHARGE NOTE PROVIDER - NSDCCPCAREPLAN_GEN_ALL_CORE_FT
PRINCIPAL DISCHARGE DIAGNOSIS  Diagnosis: Amnesia  Assessment and Plan of Treatment: You came to the hospital because you had memory loss.  Neurology saw you in the hospital and you had an MRI of your brain done. The MRI brain did not show any acute findings, only chronic findings.  Please make sure you take keppra 500mg one tablet two times a day. It is extremely important to not miss a dose. Please see head and neck surgeon Dr. Terrell on Monday, May 20 at 2pm. Please see Dr. Caputo on Wednesday, May 22 at 1pm. Please see neurologist nurse practitioner Nickie Hannah on Monday, June 17 at 230pm. Her office is located at [facility] on the 8th floor.You should consider getting a neuropsychological evaluation done as an outpatient. Nickie Hannah can help you arrange for this.      SECONDARY DISCHARGE DIAGNOSES  Diagnosis: Seizures  Assessment and Plan of Treatment: You have a history of seizures. Please make sure to take keppra 500mg one tablet two times a day. It is extremely important to not miss a dose. You had video EEG monitoring that showed no seizure activity. Please see head and neck surgeon Dr. Terrell on Monday, May 20 at 2pm.   Please see Dr. Caputo on Wednesday, May 22 at 1pm. Please see neurologist nurse practitioner Nickie Hannah on Monday, June 17 at 230pm. Her office is located at [facility] on the 8th floor.    Diagnosis: Diabetes  Assessment and Plan of Treatment: Please continue to take your home diabetes medications.

## 2019-05-16 NOTE — DISCHARGE NOTE PROVIDER - CARE PROVIDER_API CALL
Armin Caputo)  Internal Medicine; Nephrology  [address], [address] [address]  Phone: [phone]  Fax: [phone]  Follow Up Time: 2 weeks    Cecilia Kaur)  Neurology; Vascular Neurology  [address] [address]  Phone: [phone]  Fax: [phone]  Follow Up Time: 1 month Armin Caputo)  Internal Medicine; Nephrology  [address], [address]  [place] [address]  Phone: [phone]  Fax: [phone]  Follow Up Time: 2 weeks    Cecilia Kaur)  Neurology; Vascular Neurology  [address], [address] [place] [address]  Phone: [phone]  Fax: [phone]  Follow Up Time:     Lelia Hannah (NP; RN)  NP in [facility]  [address] [place] [address]  Phone: [phone]  Fax: [phone]  Follow Up Time: 1 month    Young Terrell)  Otolaryngology  [address], [address]th [place] [address]  Phone: [phone]  Fax: [phone]  Follow Up Time:

## 2019-05-16 NOTE — DISCHARGE NOTE PROVIDER - HOSPITAL COURSE
68 year old female with PMH DM, HLD, seizures, L eye blindness, meningioma resected 25 years ago and again 1 year ago, (second operation complicated by meningitis which also required surgery) who presented with 2 days of amnesia. Upon arrival to the ED, vital signs were /87, HR 92, RR 16, temperature 97.8 degrees and saturating 96% on room air.  CT head showed no acute intracranial hemorrhage, transcortical infarction or mass effect but did show encephalomalacic changes in the frontal lobes.  Patient was admitted for further workup of forgetfulness. Neurology saw the patient. MRI head was done and showed no acute findings. MRI head did show compared to imaging from April there has been further encephalomalacia and evidence of interval intraventricular hemorrhage. Mild superimposed hydrocephalus may have developed since April 2018. No acute infarction. Video EEG was unremarkable. Patient will be discharged on keppra 500mg one tablet two times a day with outpatient follow up with PCP, neurology, and head and neck surgery. 68 year old female with PMH DM, HLD, seizures, L eye blindness, meningioma resected 25 years ago and again 1 year ago, (second operation complicated by meningitis which also required surgery) who presented with 2 days of amnesia. Upon arrival to the ED, vital signs were /87, HR 92, RR 16, temperature 97.8 degrees and saturating 96% on room air.  CT head showed no acute intracranial hemorrhage, transcortical infarction or mass effect but did show encephalomalacic changes in the frontal lobes.  Patient was admitted for further workup of forgetfulness. Neurology saw the patient. MRI head was done and showed no acute findings. MRI head did show compared to imaging from April there has been further encephalomalacia and evidence of interval intraventricular hemorrhage. Mild superimposed hydrocephalus may have developed since April 2018. No acute infarction. Video EEG showed mild bifrontal slowing suggestive of a similar degree of underlying dysfunction and occasional sharply contoured discharges over bifrontal regions suggestive of underlying hyperexcitability. Patient will be discharged on keppra 500mg one tablet two times a day with outpatient follow up with PCP, neurology, and head and neck surgery. 68 year old female with PMH DM, HLD, seizures, L eye blindness, meningioma resected 25 years ago and again 1 year ago, (second operation complicated by meningitis which also required surgery) who presented with 2 days of amnesia. Upon arrival to the ED, vital signs were /87, HR 92, RR 16, temperature 97.8 degrees and saturating 96% on room air.  CT head showed no acute intracranial hemorrhage, transcortical infarction or mass effect but did show encephalomalacic changes in the frontal lobes.  Patient was admitted for further workup of forgetfulness. Neurology saw the patient. MRI head was done and showed no acute findings. Not TIA/CVA. MRI head did show compared to imaging from April there has been further encephalomalacia and evidence of interval intraventricular hemorrhage. Mild superimposed hydrocephalus may have developed since April 2018. No acute infarction. Video EEG showed mild bifrontal slowing suggestive of a similar degree of underlying dysfunction and occasional sharply contoured discharges over bifrontal regions suggestive of underlying hyperexcitability. Diagnosis is seizure with post-ictal period.  Patient will be discharged on keppra 500mg one tablet two times a day with outpatient follow up with PCP, neurology, and head and neck surgery.

## 2019-05-16 NOTE — DISCHARGE NOTE PROVIDER - NSDCFUADDAPPT_GEN_ALL_CORE_FT
Please see head and neck surgeon Dr. Terrell on Monday, May 20 at 2pm.   Please see Dr. Caputo on Wednesday, May 22 at 1pm. Please see neurologist nurse practitioner Nickie Hannah on Monday, June 17 at 230pm. Her office is located at [facility] on the 8th floor.

## 2019-05-16 NOTE — DISCHARGE NOTE NURSING/CASE MANAGEMENT/SOCIAL WORK - NSDCFUADDAPPT_GEN_ALL_CORE_FT
Please see head and neck surgeon Dr. Terrell on Monday, May 20 at 2pm.   Please see Dr. Caputo on Wednesday, May 22 at 1pm. Please see neurologist nurse practitioner Nickie Hannah on Monday, June 17 at 230pm. Her office is located at [facility] on the 8th floor.

## 2019-05-16 NOTE — DISCHARGE NOTE NURSING/CASE MANAGEMENT/SOCIAL WORK - NSDCDPATPORTLINK_GEN_ALL_CORE
You can access the [facility] Patient Portal, offered by [facility], by registering with the following website: [email or web address]

## 2019-05-16 NOTE — DISCHARGE NOTE PROVIDER - PROVIDER TOKENS
PROVIDER:[TOKEN:[[id number]],FOLLOWUP:[2 weeks]],PROVIDER:[TOKEN:[[id number]],FOLLOWUP:[1 month]] PROVIDER:[TOKEN:[[id number]],FOLLOWUP:[2 weeks]],PROVIDER:[TOKEN:[[id number]]],PROVIDER:[TOKEN:[[id number]],FOLLOWUP:[1 month]],PROVIDER:[TOKEN:[[id number]]]

## 2019-05-17 ENCOUNTER — INBOUND DOCUMENT (OUTPATIENT)
Age: 69
End: 2019-05-17

## 2019-05-17 LAB — LEVETIRACETAM SERPL-MCNC: 24.2 MCG/ML — SIGNIFICANT CHANGE UP (ref 12–46)

## 2019-05-20 ENCOUNTER — APPOINTMENT (OUTPATIENT)
Dept: OTOLARYNGOLOGY | Facility: CLINIC | Age: 69
End: 2019-05-20
Payer: MEDICARE

## 2019-05-20 VITALS
SYSTOLIC BLOOD PRESSURE: 115 MMHG | HEART RATE: 68 BPM | OXYGEN SATURATION: 97 % | DIASTOLIC BLOOD PRESSURE: 76 MMHG | RESPIRATION RATE: 17 BRPM

## 2019-05-20 DIAGNOSIS — J34.1 CYST AND MUCOCELE OF NOSE AND NASAL SINUS: ICD-10-CM

## 2019-05-20 PROCEDURE — 99213 OFFICE O/P EST LOW 20 MIN: CPT

## 2019-05-21 ENCOUNTER — INBOUND DOCUMENT (OUTPATIENT)
Age: 69
End: 2019-05-21

## 2019-05-21 DIAGNOSIS — R41.3 OTHER AMNESIA: ICD-10-CM

## 2019-05-21 DIAGNOSIS — H54.40 BLINDNESS, ONE EYE, UNSPECIFIED EYE: ICD-10-CM

## 2019-05-21 DIAGNOSIS — G91.9 HYDROCEPHALUS, UNSPECIFIED: ICD-10-CM

## 2019-05-21 DIAGNOSIS — G40.909 EPILEPSY, UNSPECIFIED, NOT INTRACTABLE, WITHOUT STATUS EPILEPTICUS: ICD-10-CM

## 2019-05-21 DIAGNOSIS — E11.9 TYPE 2 DIABETES MELLITUS WITHOUT COMPLICATIONS: ICD-10-CM

## 2019-05-21 DIAGNOSIS — Z91.14 PATIENT'S OTHER NONCOMPLIANCE WITH MEDICATION REGIMEN: ICD-10-CM

## 2019-05-21 DIAGNOSIS — E78.5 HYPERLIPIDEMIA, UNSPECIFIED: ICD-10-CM

## 2019-05-21 DIAGNOSIS — G93.89 OTHER SPECIFIED DISORDERS OF BRAIN: ICD-10-CM

## 2019-05-21 PROBLEM — J34.1 FRONTAL MUCOCELE: Status: ACTIVE | Noted: 2019-05-21

## 2019-05-22 ENCOUNTER — LABORATORY RESULT (OUTPATIENT)
Age: 69
End: 2019-05-22

## 2019-05-22 ENCOUNTER — APPOINTMENT (OUTPATIENT)
Dept: ENDOCRINOLOGY | Facility: CLINIC | Age: 69
End: 2019-05-22
Payer: MEDICARE

## 2019-05-22 ENCOUNTER — APPOINTMENT (OUTPATIENT)
Dept: NEPHROLOGY | Facility: CLINIC | Age: 69
End: 2019-05-22
Payer: MEDICARE

## 2019-05-22 ENCOUNTER — FORM ENCOUNTER (OUTPATIENT)
Age: 69
End: 2019-05-22

## 2019-05-22 VITALS — DIASTOLIC BLOOD PRESSURE: 74 MMHG | HEART RATE: 76 BPM | SYSTOLIC BLOOD PRESSURE: 117 MMHG

## 2019-05-22 VITALS — BODY MASS INDEX: 21.02 KG/M2 | HEART RATE: 84 BPM | OXYGEN SATURATION: 98 % | WEIGHT: 104.25 LBS | HEIGHT: 59 IN

## 2019-05-22 VITALS — SYSTOLIC BLOOD PRESSURE: 120 MMHG | DIASTOLIC BLOOD PRESSURE: 70 MMHG | HEART RATE: 72 BPM

## 2019-05-22 VITALS
DIASTOLIC BLOOD PRESSURE: 73 MMHG | HEART RATE: 83 BPM | SYSTOLIC BLOOD PRESSURE: 118 MMHG | WEIGHT: 104 LBS | BODY MASS INDEX: 21.01 KG/M2

## 2019-05-22 VITALS — HEART RATE: 84 BPM | DIASTOLIC BLOOD PRESSURE: 70 MMHG | SYSTOLIC BLOOD PRESSURE: 122 MMHG

## 2019-05-22 DIAGNOSIS — R63.0 ANOREXIA: ICD-10-CM

## 2019-05-22 DIAGNOSIS — R42 DIZZINESS AND GIDDINESS: ICD-10-CM

## 2019-05-22 DIAGNOSIS — G47.9 SLEEP DISORDER, UNSPECIFIED: ICD-10-CM

## 2019-05-22 LAB
HCV AB S/CO SERPL IA: 0.1 S/CO — SIGNIFICANT CHANGE UP
HCV AB SERPL-IMP: SIGNIFICANT CHANGE UP

## 2019-05-22 PROCEDURE — 99214 OFFICE O/P EST MOD 30 MIN: CPT | Mod: 25

## 2019-05-22 PROCEDURE — 99204 OFFICE O/P NEW MOD 45 MIN: CPT

## 2019-05-22 PROCEDURE — 36415 COLL VENOUS BLD VENIPUNCTURE: CPT

## 2019-05-22 RX ORDER — METFORMIN HYDROCHLORIDE 1000 MG/1
1000 TABLET, COATED ORAL TWICE DAILY
Qty: 60 | Refills: 5 | Status: ACTIVE | COMMUNITY

## 2019-05-22 RX ORDER — VALACYCLOVIR 1 G/1
1 TABLET, FILM COATED ORAL
Qty: 2 | Refills: 0 | Status: DISCONTINUED | COMMUNITY
Start: 2018-03-02 | End: 2019-05-22

## 2019-05-22 RX ORDER — DAPAGLIFLOZIN 5 MG/1
5 TABLET, FILM COATED ORAL
Qty: 30 | Refills: 5 | Status: ACTIVE | COMMUNITY
Start: 2019-05-22 | End: 1900-01-01

## 2019-05-22 RX ORDER — CEFUROXIME AXETIL 500 MG/1
500 TABLET ORAL
Qty: 10 | Refills: 0 | Status: DISCONTINUED | COMMUNITY
Start: 2018-03-01 | End: 2019-05-22

## 2019-05-22 RX ORDER — DULAGLUTIDE 1.5 MG/.5ML
1.5 INJECTION, SOLUTION SUBCUTANEOUS
Qty: 4 | Refills: 5 | Status: ACTIVE | COMMUNITY
Start: 2019-05-22 | End: 1900-01-01

## 2019-05-22 RX ORDER — SITAGLIPTIN 100 MG/1
100 TABLET, FILM COATED ORAL
Qty: 30 | Refills: 0 | Status: DISCONTINUED | COMMUNITY
Start: 2018-03-01 | End: 2019-05-22

## 2019-05-22 RX ORDER — ONDANSETRON 4 MG/1
4 TABLET ORAL
Qty: 20 | Refills: 0 | Status: DISCONTINUED | COMMUNITY
Start: 2018-03-02 | End: 2019-05-22

## 2019-05-22 NOTE — REASON FOR VISIT
[Follow-Up] : a follow-up visit [Other: _____] : [unfilled] [FreeTextEntry1] : and reassessment following admission to [facility] for altered mental status.

## 2019-05-22 NOTE — END OF VISIT
[FreeTextEntry3] : All medical record entries made by the Scribe were at my, Dr. Armin Caputo, direction and personally dictated by me on 05/22/2019. I have reviewed the chart and agree that the record accurately reflects my personal performance of the history, physical exam, assessment and plan. I have also personally directed, reviewed, and agreed with the chart.

## 2019-05-22 NOTE — ASSESSMENT
[FreeTextEntry1] : Plan:\par 1) DM: most recent HbA1c 9.5 in office markedly elevated, arranged for endocrinology consultation today to consider additional therapies for glycemic control, will reassess A1c on blood work today, instructed patient to hold metformin from now until after scheduled contrast CT tomorrow due to potential renal toxicity of dye.\par 2) HLD: continue management with Lipitor, will recheck lipid profile on labs today and consider increasing dosage at future visits.\par 3) H/o meningioma with recent altered mental status: urged patient to follow-up for brain CT scan tomorrow and further evaluation as directed by Dr. eTrrell, instructed patient to consult Dr. Terrell about potential future ear surgery.\par 4) Epilepsy: will consider referral for epilepsy evaluation to consider intolerance to Keppra which may contribute to fatigue after completion of workup with Dr. Terrell to assess for any structural damage.\par 5) Poor dentition: will plan to arrange dental evaluation for patient, discussed importance of adequate dentition to maintain healthy nutrition.\par 6) Abnormal coagulation tests: APTT 44.5 seconds on previous labs, will continue investigation of coagulation on blood work today.\par 7) Uterine prolapse: will refer patient for gynecology evaluation with Dr. Darinel Gregory.\par \par Changes to Medications: hold metformin until after CT scheduled for tomorrow.\par \par The patient's blood pressure was acceptable today. Labs were drawn and patient will return in 2 weeks for a follow-up appointment.

## 2019-05-22 NOTE — HISTORY OF PRESENT ILLNESS
[FreeTextEntry1] : The patient is a 68 year old female presenting for a follow-up evaluation and active reassessment of NIDDM, sleep disorder, anorexia, and epilepsy with h/o meningitis. \par \par Interval Data:\par - Hospitalization on 5/13/19: referred from last visit here and presented with 2 days of amnesia, head CT showed encephalomalacic changes in frontal lobes, brain MRI revealed further encephalomalacia and evidence of interval intraventricular hemorrhage compared to April, video EEG showed mild bifrontal slowing suggestive of a similar degree of underlying dysfunction and occasional sharply contoured discharged over bifrontal regions suggestive of underlying hyperexcitability, diagnosis was seizure with postictal period and patient discharged on 5/16/19 on Keppra 500mg BID.\par - Labs on 5/8/19: INR 1.08, prothrombin time 12.4 sec, APTT 44.5 sec, HbA1c 9.5, mean plasma glucose 226, glucose 173, creatinine 0.63, , total cholesterol 192, triglyceride 185, C4 40, aldosterone 29.4, cardiolipin Ab positive, cardiolipin Ab IgG 13.1, and cardiolipin Ab IgM 25.8. \par \par Current Complaints:\par - denies recurrence of amnesia or altered mental status since discharge.\par - patient complains of fatigue following discharge, daughter reports poor appetite.\par - reportedly scheduled for brain CT scan tomorrow as per Dr. Young Terrell.\par - again inquires about potential future ear surgery.\par - daughter requests referral for gynecology evaluation of uterine prolapse.\par \par Current Medications: Trulicity 1.5mg QW, metformin 500mg BID, Lipitor 20mg QD, Keppra 500mg BID.\par \par Previous Medications: patient previously prescribed Farxiga 5mg QD (cannot recall prescribing physician) but is no longer taking.

## 2019-05-22 NOTE — PHYSICAL EXAM
[General Appearance - Alert] : alert [General Appearance - In No Acute Distress] : in no acute distress [Sclera] : the sclera and conjunctiva were normal [PERRL With Normal Accommodation] : pupils were equal in size, round, and reactive to light [Extraocular Movements] : extraocular movements were intact [Outer Ear] : the ears and nose were normal in appearance [Oropharynx] : the oropharynx was normal [Neck Appearance] : the appearance of the neck was normal [Neck Cervical Mass (___cm)] : no neck mass was observed [Jugular Venous Distention Increased] : there was no jugular-venous distention [Thyroid Diffuse Enlargement] : the thyroid was not enlarged [Thyroid Nodule] : there were no palpable thyroid nodules [Auscultation Breath Sounds / Voice Sounds] : lungs were clear to auscultation bilaterally [Heart Rate And Rhythm] : heart rate was normal and rhythm regular [Heart Sounds] : normal S1 and S2 [Heart Sounds Gallop] : no gallops [Murmurs] : no murmurs [Heart Sounds Pericardial Friction Rub] : no pericardial rub [Edema] : there was no peripheral edema [Bowel Sounds] : normal bowel sounds [Abdomen Soft] : soft [Abdomen Tenderness] : non-tender [Abdomen Mass (___ Cm)] : no abdominal mass palpated [No CVA Tenderness] : no ~M costovertebral angle tenderness [No Spinal Tenderness] : no spinal tenderness [Abnormal Walk] : normal gait [Nail Clubbing] : no clubbing  or cyanosis of the fingernails [Musculoskeletal - Swelling] : no joint swelling seen [Motor Tone] : muscle strength and tone were normal [Skin Color & Pigmentation] : normal skin color and pigmentation [Skin Turgor] : normal skin turgor [] : no rash [No Focal Deficits] : no focal deficits [Oriented To Time, Place, And Person] : oriented to person, place, and time [Impaired Insight] : insight and judgment were intact [Affect] : the affect was normal [FreeTextEntry1] : chronic poor dentition.

## 2019-05-23 ENCOUNTER — OUTPATIENT (OUTPATIENT)
Dept: OUTPATIENT SERVICES | Facility: HOSPITAL | Age: 69
LOS: 1 days | End: 2019-05-23
Payer: MEDICARE

## 2019-05-23 ENCOUNTER — APPOINTMENT (OUTPATIENT)
Dept: CT IMAGING | Facility: HOSPITAL | Age: 69
End: 2019-05-23
Payer: MEDICARE

## 2019-05-23 ENCOUNTER — INBOUND DOCUMENT (OUTPATIENT)
Age: 69
End: 2019-05-23

## 2019-05-23 DIAGNOSIS — Z98.890 OTHER SPECIFIED POSTPROCEDURAL STATES: Chronic | ICD-10-CM

## 2019-05-23 PROCEDURE — 70487 CT MAXILLOFACIAL W/DYE: CPT | Mod: 26

## 2019-05-23 PROCEDURE — 70487 CT MAXILLOFACIAL W/DYE: CPT

## 2019-05-23 NOTE — ASSESSMENT
[FreeTextEntry1] : Diabetes, uncontrolled.  Goal A1c < 7.0%.\par continue current regimen; it seems glucose is improving. Potential complications of diabetes reviewed (blindness, kidney disease, nerve damage) and importance of glucose control discussed to prevent complications.  Test glucose once/day, alternating between fasting (am goal < 130) and bedtime/post dinner (goal < 180). Foot care discussed.  Carb guide given; advised to limit carb portions to half cup servings.  \par REcheck A1c in 3 months.\par RTO 2-3 months

## 2019-05-23 NOTE — PHYSICAL EXAM
[Alert] : alert [Healthy Appearance] : healthy appearance [Normal Voice/Communication] : normal voice communication [No Proptosis] : no proptosis [No Lid Lag] : no lid lag [Normal Hearing] : hearing was normal [Thyroid Not Enlarged] : the thyroid was not enlarged [No Thyroid Nodules] : there were no palpable thyroid nodules [Clear to Auscultation] : lungs were clear to auscultation bilaterally [Normal S1, S2] : normal S1 and S2 [Regular Rhythm] : with a regular rhythm [No Edema] : there was no peripheral edema [Normal Sensation on Monofilament Testing] : normal sensation on monofilament testing of lower extremities [Normal Affect] : the affect was normal [Normal Mood] : the mood was normal [Foot Ulcers] : no foot ulcers [Acanthosis Nigricans] : no acanthosis nigricans [de-identified] : fingerstick 133, post lunch (tuna sandwich) [de-identified] : trace DP pulses [de-identified] : faby

## 2019-05-23 NOTE — HISTORY OF PRESENT ILLNESS
[FreeTextEntry1] : here with daughter\par diagnosed with diabetes in Feb 2018, prior to surgery for meningioma, was on high dose steroids at the time\par L eye is blind due to the meingioma\par mother had diabetes \par not testing glucose the past week because her  tests for her and he has been away\par always has dry mouth, doesn't like to drink water. no polyuria\par Trulicity started 4 weeks ago. also taking Farxiga.. not having side effects to meds.\par c/o being tired.\par no chest pain, SOB or neuropathy symptoms\par doesn't walk alone and has walker at home.\par \par Meds:\par metformin 1g bid\par Farxiga 5mg\par Trulicity 1.5mg weekly\par atorvastatin 20mg\par Keppra 500mg bid

## 2019-05-27 LAB
ALBUMIN SERPL ELPH-MCNC: 4.2 G/DL
ALP BLD-CCNC: 88 U/L
ALT SERPL-CCNC: 16 U/L
ANION GAP SERPL CALC-SCNC: 15 MMOL/L
APPEARANCE: ABNORMAL
APTT BLD: 27.6 SEC
AST SERPL-CCNC: 11 U/L
BACTERIA: NEGATIVE
BASOPHILS # BLD AUTO: 0.05 K/UL
BASOPHILS NFR BLD AUTO: 0.6 %
BILIRUB SERPL-MCNC: 0.2 MG/DL
BILIRUBIN URINE: NEGATIVE
BLOOD URINE: NEGATIVE
BUN SERPL-MCNC: 11 MG/DL
C PEPTIDE SERPL-MCNC: 5.3 NG/ML
CALCIUM SERPL-MCNC: 9.8 MG/DL
CHLORIDE SERPL-SCNC: 105 MMOL/L
CHOLEST SERPL-MCNC: 148 MG/DL
CHOLEST/HDLC SERPL: 4.2 RATIO
CO2 SERPL-SCNC: 22 MMOL/L
COLOR: YELLOW
CORTIS SERPL-MCNC: 8.5 UG/DL
CREAT SERPL-MCNC: 0.62 MG/DL
CREAT SPEC-SCNC: 99 MG/DL
CREAT/PROT UR: 0.1 RATIO
EOSINOPHIL # BLD AUTO: 0.26 K/UL
EOSINOPHIL NFR BLD AUTO: 2.9 %
FACT VIII ACT/NOR PPP: 255 %
FACT X ACT/NOR PPP: 94 %
FACT XI ACT/NOR PPP: 144 %
FERRITIN SERPL-MCNC: 75 NG/ML
FOLATE SERPL-MCNC: 9.4 NG/ML
GLUCOSE QUALITATIVE U: NEGATIVE
GLUCOSE SERPL-MCNC: 114 MG/DL
HCT VFR BLD CALC: 35.9 %
HCYS SERPL-MCNC: 10.8 UMOL/L
HDLC SERPL-MCNC: 35 MG/DL
HGB BLD-MCNC: 11.3 G/DL
HYALINE CASTS: 3 /LPF
IGF BP1 SERPL-MCNC: 166 NG/ML
IMM GRANULOCYTES NFR BLD AUTO: 0.4 %
INR PPP: 0.95 RATIO
IRON SATN MFR SERPL: 21 %
IRON SERPL-MCNC: 55 UG/DL
KETONES URINE: NEGATIVE
LDLC SERPL CALC-MCNC: 73 MG/DL
LEUKOCYTE ESTERASE URINE: NEGATIVE
LYMPHOCYTES # BLD AUTO: 2.17 K/UL
LYMPHOCYTES NFR BLD AUTO: 24.4 %
MAGNESIUM SERPL-MCNC: 1.6 MG/DL
MAN DIFF?: NORMAL
MCHC RBC-ENTMCNC: 29.6 PG
MCHC RBC-ENTMCNC: 31.5 GM/DL
MCV RBC AUTO: 94 FL
MICROSCOPIC-UA: NORMAL
MONOCYTES # BLD AUTO: 0.69 K/UL
MONOCYTES NFR BLD AUTO: 7.7 %
NEUTROPHILS # BLD AUTO: 5.7 K/UL
NEUTROPHILS NFR BLD AUTO: 64 %
NITRITE URINE: NEGATIVE
PH URINE: 5.5
PHOSPHATE SERPL-MCNC: 3.4 MG/DL
PLATELET # BLD AUTO: 344 K/UL
POTASSIUM SERPL-SCNC: 4.8 MMOL/L
PROT SERPL-MCNC: 6.6 G/DL
PROT UR-MCNC: 9 MG/DL
PROTEIN URINE: NEGATIVE
PT BLD: 10.9 SEC
RBC # BLD: 3.82 M/UL
RBC # FLD: 13.5 %
RED BLOOD CELLS URINE: 1 /HPF
SODIUM SERPL-SCNC: 142 MMOL/L
SPECIFIC GRAVITY URINE: 1.02
SQUAMOUS EPITHELIAL CELLS: 13 /HPF
T3FREE SERPL-MCNC: 2.59 PG/ML
T3RU NFR SERPL: 1 TBI
T4 FREE SERPL-MCNC: 1 NG/DL
T4 SERPL-MCNC: 6.5 UG/DL
THYROGLOB AB SERPL-ACNC: <20 IU/ML
THYROPEROXIDASE AB SERPL IA-ACNC: <10 IU/ML
TIBC SERPL-MCNC: 262 UG/DL
TRIGL SERPL-MCNC: 201 MG/DL
TSH SERPL-ACNC: 1.02 UIU/ML
UIBC SERPL-MCNC: 207 UG/DL
URATE SERPL-MCNC: 4.6 MG/DL
UROBILINOGEN URINE: NORMAL
VIT B12 SERPL-MCNC: 334 PG/ML
WBC # FLD AUTO: 8.91 K/UL
WHITE BLOOD CELLS URINE: 4 /HPF

## 2019-05-28 LAB
DNA PLOIDY SPEC FC-IMP: NORMAL
METHYLMALONATE SERPL-SCNC: 142 NMOL/L

## 2019-05-28 NOTE — HISTORY OF PRESENT ILLNESS
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 Browne at [facility] on 2/21/18.  Patient and her  state that she started to have changes in L eye vision and moments of forgetfulness, which resulted in the diagnosis of recurrent meningioma.  It is unclear exactly why pt went to [facility] for surgery but she went in the mid-to-end of this past Feb and underwent an image-guided endoscopic endonasal ACF with resection of recurrent meningioma, optic nerve decompression, drainage of multiple mucoceles and duraplasty/CSF leak repair. When pt returned to [place] she went to see Dr. Araya for debridement.  Eventually pt was referred to us for further care. \par \par Patient currently denies any purulent nasal discharge, epistaxis, clear rhinorrhea, salty taste, postnasal drip, headache, fever or chills. Still with decreased vision in L eye. States that vision in both eyes has worsened but L>>R.  Also c/o decreased L ear hearing despite recent round of abx for L ear infection.\par  [FreeTextEntry1] : Was adm [facility] w meningitis s/p emergent procedure to manage meningitis. For for f/u of symptoms.

## 2019-05-28 NOTE — ASSESSMENT
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 Browne at [facility] on 2/21/18. Had another further recurrence of [facility] [place] [facility] [facility]\par \par PLAN:\par - repeat CT skull base to evaluate possible skull base defects and further mx, for CT garcía. 3D recon CT needed as well\par - f/u after

## 2019-05-28 NOTE — DATA REVIEWED
[de-identified] : Reviewed MRI from [place]. Skull base abnormality that predisposes to meningitis cannot be ruled out. Further CT is needed and will be ordered

## 2019-05-28 NOTE — PHYSICAL EXAM
[Nasal Endoscopy Performed] : nasal endoscopy was performed, see procedure section for findings [Midline] : trachea located in midline position [Normal] : orientation to person, place, and time: normal [de-identified] : decreased vision of L eye, can see shapes/count fingers but not clearly

## 2019-05-28 NOTE — PROCEDURE
[FreeTextEntry6] : The procedure was explained to the patient, and verbal consent was obtained. The bilateral nasal cavities were sprayed with topical phenylephrine and lidocaine solution. A zero-degree rigid endoscope was used to examine the bilateral nasal cavities and nasopharynx. \par Right nasal cavity: no significant crusting, no purulent discharge/masses/lesion\par Left nasal cavity: post-surgical changes, no significant crusting, no purulent discharge/masses/lesion\par The patient tolerated procedure well without any complications.\par \par

## 2019-06-04 ENCOUNTER — LABORATORY RESULT (OUTPATIENT)
Age: 69
End: 2019-06-04

## 2019-06-04 ENCOUNTER — APPOINTMENT (OUTPATIENT)
Dept: NEPHROLOGY | Facility: CLINIC | Age: 69
End: 2019-06-04
Payer: MEDICARE

## 2019-06-04 VITALS — SYSTOLIC BLOOD PRESSURE: 100 MMHG | HEART RATE: 84 BPM | DIASTOLIC BLOOD PRESSURE: 62 MMHG

## 2019-06-04 VITALS — BODY MASS INDEX: 21.81 KG/M2 | OXYGEN SATURATION: 97 % | WEIGHT: 108 LBS | HEART RATE: 86 BPM

## 2019-06-04 VITALS — SYSTOLIC BLOOD PRESSURE: 117 MMHG | DIASTOLIC BLOOD PRESSURE: 77 MMHG | HEART RATE: 86 BPM

## 2019-06-04 VITALS — DIASTOLIC BLOOD PRESSURE: 74 MMHG | SYSTOLIC BLOOD PRESSURE: 122 MMHG | HEART RATE: 87 BPM

## 2019-06-04 VITALS — HEART RATE: 96 BPM | DIASTOLIC BLOOD PRESSURE: 50 MMHG | SYSTOLIC BLOOD PRESSURE: 88 MMHG

## 2019-06-04 DIAGNOSIS — Z86.61 PERSONAL HISTORY OF INFECTIONS OF THE CENTRAL NERVOUS SYSTEM: ICD-10-CM

## 2019-06-04 DIAGNOSIS — R41.3 OTHER AMNESIA: ICD-10-CM

## 2019-06-04 PROCEDURE — 36415 COLL VENOUS BLD VENIPUNCTURE: CPT

## 2019-06-04 PROCEDURE — 99214 OFFICE O/P EST MOD 30 MIN: CPT | Mod: 25

## 2019-06-04 NOTE — ASSESSMENT
[FreeTextEntry1] : Plan:\par 1) HLD: lipids found to be well-controlled and within normal limits on current therapy of Lipitor 20mg, due to patient's tolerance of current treatment and acceptable lab results we will not adjust course at this time, will continue to monitor with lipid profile on blood work today.\par 2) DM: patient's glycemic control found to be improving by Dr. Becker; we will therefore not adjust her current therapy of Trulicity, metformin, and Farxiga today but will recheck blood glucose levels on labs drawn today following addition of Farxiga.\par 3) Poor dentition: will refer patient for dental evaluation with Dr. Kip Robertson\par 4) H/o meningioma with previous altered mental status: reviewed data from recent maxillofacial CT with patient and family today, have arranged more expedient follow-up with Dr. Terrell tomorrow morning to reassess possible interventions.\par 5) Epilepsy: will consider referral for epilepsy evaluation to consider intolerance to Keppra which may contribute to fatigue after completion of workup with Dr. Terrell.\par \par Changes to Medications: none.\par \par The patient's blood pressure was grossly acceptable today. Labs were drawn and patient's return will depend on course of treatment determined by Dr. Terrell.

## 2019-06-04 NOTE — END OF VISIT
[FreeTextEntry3] : All medical record entries made by the Scribe were at my, Dr. Armin Caputo, direction and personally dictated by me on 06/04/2019. I have reviewed the chart and agree that the record accurately reflects my personal performance of the history, physical exam, assessment and plan. I have also personally directed, reviewed, and agreed with the chart.

## 2019-06-04 NOTE — REASON FOR VISIT
[Follow-Up] : a follow-up visit [Family Member] : family member [FreeTextEntry1] : for review of recent CT imaging and possible interventions with Dr. Young Terrell, and with continued complaints of fatigue.

## 2019-06-04 NOTE — ADDENDUM
[FreeTextEntry1] : I, Stevenson Perry, acted solely as a scribe for Dr. Armin Caputo on this date 06/04/2019.

## 2019-06-11 LAB
24R-OH-CALCIDIOL SERPL-MCNC: 53.9 PG/ML
25(OH)D3 SERPL-MCNC: 26.6 NG/ML
ACTH SER-ACNC: 35.6 PG/ML
ALBUMIN MFR SERPL ELPH: 62.3 %
ALBUMIN SERPL ELPH-MCNC: 4.6 G/DL
ALBUMIN SERPL-MCNC: 4.1 G/DL
ALBUMIN/GLOB SERPL: 1.6 RATIO
ALDOSTERONE SERUM: 7.7 NG/DL
ALP BLD-CCNC: 98 U/L
ALP BONE SERPL-MCNC: 18 MCG/L
ALPHA1 GLOB MFR SERPL ELPH: 4.4 %
ALPHA1 GLOB SERPL ELPH-MCNC: 0.3 G/DL
ALPHA2 GLOB MFR SERPL ELPH: 11.1 %
ALPHA2 GLOB SERPL ELPH-MCNC: 0.7 G/DL
ALT SERPL-CCNC: 14 U/L
ANA SER IF-ACNC: NEGATIVE
ANION GAP SERPL CALC-SCNC: 19 MMOL/L
APPEARANCE: ABNORMAL
APTT BLD: 28.3 SEC
AST SERPL-CCNC: 12 U/L
B-GLOBULIN MFR SERPL ELPH: 11.5 %
B-GLOBULIN SERPL ELPH-MCNC: 0.8 G/DL
BACTERIA: NEGATIVE
BASOPHILS # BLD AUTO: 0.07 K/UL
BASOPHILS NFR BLD AUTO: 0.7 %
BILIRUB SERPL-MCNC: 0.3 MG/DL
BILIRUBIN URINE: NEGATIVE
BLOOD URINE: NEGATIVE
BUN SERPL-MCNC: 14 MG/DL
C3 SERPL-MCNC: 113 MG/DL
C4 SERPL-MCNC: 38 MG/DL
CALCIUM SERPL-MCNC: 10.1 MG/DL
CALCIUM SERPL-MCNC: 10.1 MG/DL
CHLORIDE SERPL-SCNC: 104 MMOL/L
CHOLEST SERPL-MCNC: 184 MG/DL
CHOLEST/HDLC SERPL: 4.8 RATIO
CO2 SERPL-SCNC: 21 MMOL/L
COLLAGEN CTX SERPL-MCNC: 327 PG/ML
COLOR: YELLOW
CORTIS SERPL-MCNC: 10.5 UG/DL
CREAT SERPL-MCNC: 0.59 MG/DL
CREAT SPEC-SCNC: 122 MG/DL
CREAT/PROT UR: 0.1 RATIO
CRP SERPL-MCNC: 0.36 MG/DL
CYSTATIN C SERPL-MCNC: 0.9 MG/L
DEPRECATED KAPPA LC FREE/LAMBDA SER: 1.04 RATIO
EOSINOPHIL # BLD AUTO: 0.34 K/UL
EOSINOPHIL NFR BLD AUTO: 3.4 %
ERYTHROCYTE [SEDIMENTATION RATE] IN BLOOD BY WESTERGREN METHOD: 25 MM/HR
ESTIMATED AVERAGE GLUCOSE: 183 MG/DL
FERRITIN SERPL-MCNC: 82 NG/ML
FOLATE SERPL-MCNC: 8.4 NG/ML
GAMMA GLOB FLD ELPH-MCNC: 0.7 G/DL
GAMMA GLOB MFR SERPL ELPH: 10.7 %
GFR/BSA.PRED SERPLBLD CYS-BASED-ARV: 80 ML/MIN
GLUCOSE QUALITATIVE U: NEGATIVE
GLUCOSE SERPL-MCNC: 160 MG/DL
HBA1C MFR BLD HPLC: 8 %
HBV SURFACE AB SER QL: NONREACTIVE
HBV SURFACE AG SER QL: NONREACTIVE
HCT VFR BLD CALC: 36.2 %
HCV AB SER QL: NONREACTIVE
HCV S/CO RATIO: 0.17 S/CO
HCYS SERPL-MCNC: 10.6 UMOL/L
HDLC SERPL-MCNC: 38 MG/DL
HGB BLD-MCNC: 11.1 G/DL
HYALINE CASTS: 1 /LPF
IGA SER QL IEP: 156 MG/DL
IGG SER QL IEP: 592 MG/DL
IGM SER QL IEP: 332 MG/DL
IMM GRANULOCYTES NFR BLD AUTO: 0.4 %
INR PPP: 0.94 RATIO
INTERPRETATION SERPL IEP-IMP: NORMAL
IRON SATN MFR SERPL: 20 %
IRON SERPL-MCNC: 59 UG/DL
KAPPA LC CSF-MCNC: 1.96 MG/DL
KAPPA LC SERPL-MCNC: 2.04 MG/DL
KETONES URINE: NEGATIVE
LDLC SERPL CALC-MCNC: 93 MG/DL
LEUKOCYTE ESTERASE URINE: ABNORMAL
LYMPHOCYTES # BLD AUTO: 1.78 K/UL
LYMPHOCYTES NFR BLD AUTO: 17.7 %
M PROTEIN SPEC IFE-MCNC: NORMAL
MAGNESIUM SERPL-MCNC: 1.9 MG/DL
MAN DIFF?: NORMAL
MCHC RBC-ENTMCNC: 29.3 PG
MCHC RBC-ENTMCNC: 30.7 GM/DL
MCV RBC AUTO: 95.5 FL
METHYLMALONATE SERPL-SCNC: 118 NMOL/L
MICROSCOPIC-UA: NORMAL
MONOCYTES # BLD AUTO: 0.79 K/UL
MONOCYTES NFR BLD AUTO: 7.8 %
MPO AB + PR3 PNL SER: NORMAL
NEUTROPHILS # BLD AUTO: 7.05 K/UL
NEUTROPHILS NFR BLD AUTO: 70 %
NITRITE URINE: NEGATIVE
NT-PROBNP SERPL-MCNC: 144 PG/ML
PARATHYROID HORMONE INTACT: 30 PG/ML
PH URINE: 5
PHOSPHATE SERPL-MCNC: 3.8 MG/DL
PLATELET # BLD AUTO: 307 K/UL
POTASSIUM SERPL-SCNC: 4.4 MMOL/L
PROLACTIN SERPL-MCNC: 11.7 NG/ML
PROT SERPL-MCNC: 6.6 G/DL
PROT UR-MCNC: 10 MG/DL
PROTEIN URINE: NEGATIVE
PT BLD: 10.6 SEC
RBC # BLD: 3.79 M/UL
RBC # FLD: 13.9 %
RED BLOOD CELLS URINE: 1 /HPF
RENIN PLASMA: 7.5 PG/ML
RHEUMATOID FACT SER QL: <10 IU/ML
SODIUM SERPL-SCNC: 144 MMOL/L
SPECIFIC GRAVITY URINE: 1.02
SQUAMOUS EPITHELIAL CELLS: 14 /HPF
T3FREE SERPL-MCNC: 2.84 PG/ML
T3RU NFR SERPL: 1 TBI
T4 FREE SERPL-MCNC: 1.1 NG/DL
T4 SERPL-MCNC: 6.9 UG/DL
THYROGLOB AB SERPL-ACNC: <20 IU/ML
THYROPEROXIDASE AB SERPL IA-ACNC: <10 IU/ML
TIBC SERPL-MCNC: 295 UG/DL
TRIGL SERPL-MCNC: 267 MG/DL
TSH SERPL-ACNC: 1.19 UIU/ML
UIBC SERPL-MCNC: 236 UG/DL
URATE SERPL-MCNC: 4.9 MG/DL
UROBILINOGEN URINE: NORMAL
VIT B12 SERPL-MCNC: 325 PG/ML
WBC # FLD AUTO: 10.07 K/UL
WHITE BLOOD CELLS URINE: 8 /HPF

## 2019-06-17 ENCOUNTER — APPOINTMENT (OUTPATIENT)
Dept: NEUROLOGY | Facility: CLINIC | Age: 69
End: 2019-06-17

## 2019-09-05 ENCOUNTER — APPOINTMENT (OUTPATIENT)
Dept: NEPHROLOGY | Facility: CLINIC | Age: 69
End: 2019-09-05
Payer: MEDICARE

## 2019-09-05 ENCOUNTER — LABORATORY RESULT (OUTPATIENT)
Age: 69
End: 2019-09-05

## 2019-09-05 VITALS — DIASTOLIC BLOOD PRESSURE: 70 MMHG | SYSTOLIC BLOOD PRESSURE: 120 MMHG | HEART RATE: 84 BPM

## 2019-09-05 VITALS — HEART RATE: 84 BPM | DIASTOLIC BLOOD PRESSURE: 78 MMHG | SYSTOLIC BLOOD PRESSURE: 124 MMHG

## 2019-09-05 VITALS — DIASTOLIC BLOOD PRESSURE: 70 MMHG | SYSTOLIC BLOOD PRESSURE: 120 MMHG | HEART RATE: 72 BPM

## 2019-09-05 VITALS — HEART RATE: 86 BPM | OXYGEN SATURATION: 97 % | BODY MASS INDEX: 22.62 KG/M2 | WEIGHT: 112 LBS

## 2019-09-05 DIAGNOSIS — K08.9 DISORDER OF TEETH AND SUPPORTING STRUCTURES, UNSPECIFIED: ICD-10-CM

## 2019-09-05 DIAGNOSIS — E11.9 TYPE 2 DIABETES MELLITUS W/OUT COMPLICATIONS: ICD-10-CM

## 2019-09-05 DIAGNOSIS — R53.83 OTHER FATIGUE: ICD-10-CM

## 2019-09-05 DIAGNOSIS — Q01.9 ENCEPHALOCELE, UNSPECIFIED: ICD-10-CM

## 2019-09-05 DIAGNOSIS — R09.89 OTHER SPECIFIED SYMPTOMS AND SIGNS INVOLVING THE CIRCULATORY AND RESPIRATORY SYSTEMS: ICD-10-CM

## 2019-09-05 DIAGNOSIS — D32.9 BENIGN NEOPLASM OF MENINGES, UNSPECIFIED: ICD-10-CM

## 2019-09-05 PROCEDURE — 36415 COLL VENOUS BLD VENIPUNCTURE: CPT

## 2019-09-05 PROCEDURE — 99214 OFFICE O/P EST MOD 30 MIN: CPT | Mod: 25

## 2019-09-05 NOTE — ADDENDUM
[FreeTextEntry1] :  Documented by Stone Connors acting as a scribe for Dr. Armin Caputo on 09/05/2019.

## 2019-09-05 NOTE — REASON FOR VISIT
[Follow-Up] : a follow-up visit [Family Member] : family member [FreeTextEntry1] : for active reassessment of HLD and hyperglycemia.

## 2019-09-05 NOTE — HISTORY OF PRESENT ILLNESS
[FreeTextEntry1] : The patient is a 68 year old female presenting for a follow-up evaluation and active reassessment of NIDDM, HLD, past complex neurologic and neurosurgical history, and epilepsy with h/o meningitis. \par \par Interval Data:\par - Labs from 6/4/19 reveal: RBC 3.79, HGB 11.1, HCT 36.2, sedimentation rate 25, HbA1C 8.0, CoO2 of 21, anion gap 19, glucose 160, triglyceride 267, HDL 38, Vit D 25 hydroxy 26.6.\par \par Current Complaints:\par - Patient, due to meningoma and resection, she has lost vision in left eye. She reports being fatigued throughout the day. She reports her mental state has been fairly functional for daily activities, but she reports a decline in short term memory. She says she can remember 50 years ago very well, but a few weeks ago is difficult to recall. She notes that her memory has not changed in the last 3 months since our last visit. She denies any recent episodes of amnesia. She denies CP, SOB, headaches, and fever. She usually takes melatonin and gets good sleep.\par - Patient c/o hearing loss in her left ear.\par - She reports that she knows she must have major dental revision and is seeking an oral surgeon.\par - Patient has recently had a neuro ophthalmology evaluation where she reportedly had no change.\par - She reports she had a mammogram in June that was normal. Have requested report be sent to my office.\par - She requests a referral for a colonoscopy.\par \par Current Medications: Trulicity 1.5mg QW, metformin 1000mg BID, Lipitor 20mg QD, Keppra 500mg BID, Farxiga 5mg QAM (per Dr. Becker), melatonin QHS.

## 2019-09-05 NOTE — ASSESSMENT
[FreeTextEntry1] : Plan:\par 1) HLD: lipids found to be well-controlled and within normal limits on current therapy of Lipitor 20mg QD, due to patient's tolerance of current treatment and acceptable lab results we will not adjust course at this time, will continue to monitor with lipid profile on blood work today.  \par 2) Hyperglycemia: Most recent HbA1C is elevated at 8.0. Advised patient to continue lifestyle management with healthy dieting and routine exercise and to continue on current antihyperglycemic regimen and reassess with labs today. \par 3) Patient's blood pressure taken in office today is acceptable. Will continue to monitor at future visits.\par 4) Have given patient the information for Dr. Guille Robertson DDS for a dental evaluation.\par 5) Advised patient to have an audiology evaluation with Dr. Hicks for evaluation of her hearing loss.\par 6) Referred patient to Dr. Ariel Almaguer for colonoscopy. \par \par Changes to Medications: none\par \par Labs were drawn and patient will return in 2 months for a follow-up appointment.

## 2019-09-05 NOTE — END OF VISIT
[FreeTextEntry3] : All medical record entries made by the Scribe were at my, Dr. Armin Caputo, direction and personally dictated by me on 09/05/2019. I have reviewed the chart and agree that the record accurately reflects my personal performance of the history, physical exam, assessment and plan. I have also personally directed, reviewed, and agreed with the chart.

## 2019-09-05 NOTE — PHYSICAL EXAM
[General Appearance - Alert] : alert [General Appearance - In No Acute Distress] : in no acute distress [Sclera] : the sclera and conjunctiva were normal [PERRL With Normal Accommodation] : pupils were equal in size, round, and reactive to light [Outer Ear] : the ears and nose were normal in appearance [Extraocular Movements] : extraocular movements were intact [Neck Appearance] : the appearance of the neck was normal [Oropharynx] : the oropharynx was normal [Neck Cervical Mass (___cm)] : no neck mass was observed [Jugular Venous Distention Increased] : there was no jugular-venous distention [Thyroid Diffuse Enlargement] : the thyroid was not enlarged [Thyroid Nodule] : there were no palpable thyroid nodules [Auscultation Breath Sounds / Voice Sounds] : lungs were clear to auscultation bilaterally [Heart Sounds] : normal S1 and S2 [Heart Rate And Rhythm] : heart rate was normal and rhythm regular [Heart Sounds Gallop] : no gallops [Murmurs] : no murmurs [Edema] : there was no peripheral edema [Heart Sounds Pericardial Friction Rub] : no pericardial rub [Bowel Sounds] : normal bowel sounds [Abdomen Soft] : soft [Abdomen Tenderness] : non-tender [Abdomen Mass (___ Cm)] : no abdominal mass palpated [No Spinal Tenderness] : no spinal tenderness [No CVA Tenderness] : no ~M costovertebral angle tenderness [Abnormal Walk] : normal gait [Musculoskeletal - Swelling] : no joint swelling seen [Nail Clubbing] : no clubbing  or cyanosis of the fingernails [Motor Tone] : muscle strength and tone were normal [Skin Color & Pigmentation] : normal skin color and pigmentation [] : no rash [Skin Turgor] : normal skin turgor [Cranial Nerves] : cranial nerves 2-12 were intact [Motor Exam] : the motor exam was normal [Oriented To Time, Place, And Person] : oriented to person, place, and time [No Focal Deficits] : no focal deficits [Affect] : the affect was normal [Impaired Insight] : insight and judgment were intact

## 2019-09-15 LAB
24R-OH-CALCIDIOL SERPL-MCNC: 56.1 PG/ML
25(OH)D3 SERPL-MCNC: 24.5 NG/ML
ALBUMIN MFR SERPL ELPH: 66.5 %
ALBUMIN SERPL ELPH-MCNC: 4.2 G/DL
ALBUMIN SERPL-MCNC: 4.3 G/DL
ALBUMIN/GLOB SERPL: 2 RATIO
ALDOSTERONE SERUM: 5.7 NG/DL
ALP BLD-CCNC: 95 U/L
ALP BONE SERPL-MCNC: 19 MCG/L
ALPHA1 GLOB MFR SERPL ELPH: 3.4 %
ALPHA1 GLOB SERPL ELPH-MCNC: 0.2 G/DL
ALPHA2 GLOB MFR SERPL ELPH: 9.6 %
ALPHA2 GLOB SERPL ELPH-MCNC: 0.6 G/DL
ALT SERPL-CCNC: 18 U/L
ANA SER IF-ACNC: NEGATIVE
ANION GAP SERPL CALC-SCNC: 14 MMOL/L
APPEARANCE: CLEAR
AST SERPL-CCNC: 14 U/L
B-GLOBULIN MFR SERPL ELPH: 10.6 %
B-GLOBULIN SERPL ELPH-MCNC: 0.7 G/DL
BACTERIA: NEGATIVE
BASOPHILS # BLD AUTO: 0.04 K/UL
BASOPHILS NFR BLD AUTO: 0.5 %
BILIRUB SERPL-MCNC: 0.3 MG/DL
BILIRUBIN URINE: NEGATIVE
BLOOD URINE: NEGATIVE
BUN SERPL-MCNC: 14 MG/DL
C3 SERPL-MCNC: 108 MG/DL
C4 SERPL-MCNC: 34 MG/DL
CALCIUM SERPL-MCNC: 9.8 MG/DL
CALCIUM SERPL-MCNC: 9.8 MG/DL
CHLORIDE SERPL-SCNC: 102 MMOL/L
CHOLEST SERPL-MCNC: 266 MG/DL
CHOLEST/HDLC SERPL: 7 RATIO
CO2 SERPL-SCNC: 23 MMOL/L
COLLAGEN CTX SERPL-MCNC: 387 PG/ML
COLOR: NORMAL
CREAT SERPL-MCNC: 0.67 MG/DL
CREAT SPEC-SCNC: 86 MG/DL
CREAT SPEC-SCNC: 86 MG/DL
CREAT/PROT UR: 0.1 RATIO
CRP SERPL-MCNC: 0.91 MG/DL
CYSTATIN C SERPL-MCNC: 0.93 MG/L
DEPRECATED KAPPA LC FREE/LAMBDA SER: 0.99 RATIO
EOSINOPHIL # BLD AUTO: 0.33 K/UL
EOSINOPHIL NFR BLD AUTO: 4.1 %
ERYTHROCYTE [SEDIMENTATION RATE] IN BLOOD BY WESTERGREN METHOD: 19 MM/HR
ESTIMATED AVERAGE GLUCOSE: 194 MG/DL
FERRITIN SERPL-MCNC: 71 NG/ML
FOLATE SERPL-MCNC: 8.5 NG/ML
GAMMA GLOB FLD ELPH-MCNC: 0.6 G/DL
GAMMA GLOB MFR SERPL ELPH: 9.9 %
GFR/BSA.PRED SERPLBLD CYS-BASED-ARV: 77 ML/MIN
GLUCOSE QUALITATIVE U: NEGATIVE
GLUCOSE SERPL-MCNC: 165 MG/DL
HBA1C MFR BLD HPLC: 8.4 %
HBV SURFACE AB SER QL: NONREACTIVE
HBV SURFACE AG SER QL: NONREACTIVE
HCT VFR BLD CALC: 37.1 %
HCV AB SER QL: NONREACTIVE
HCV S/CO RATIO: 0.21 S/CO
HCYS SERPL-MCNC: 11.8 UMOL/L
HDLC SERPL-MCNC: 38 MG/DL
HGB BLD-MCNC: 11.5 G/DL
HYALINE CASTS: 2 /LPF
IGA SER QL IEP: 166 MG/DL
IGG SER QL IEP: 557 MG/DL
IGM SER QL IEP: 330 MG/DL
IMM GRANULOCYTES NFR BLD AUTO: 0.2 %
INTERPRETATION SERPL IEP-IMP: NORMAL
IRON SATN MFR SERPL: 24 %
IRON SERPL-MCNC: 62 UG/DL
KAPPA LC CSF-MCNC: 1.9 MG/DL
KAPPA LC SERPL-MCNC: 1.89 MG/DL
KETONES URINE: NEGATIVE
LDLC SERPL CALC-MCNC: 175 MG/DL
LEUKOCYTE ESTERASE URINE: ABNORMAL
LYMPHOCYTES # BLD AUTO: 1.8 K/UL
LYMPHOCYTES NFR BLD AUTO: 22.4 %
M PROTEIN SPEC IFE-MCNC: NORMAL
MAGNESIUM SERPL-MCNC: 1.8 MG/DL
MAN DIFF?: NORMAL
MCHC RBC-ENTMCNC: 28.8 PG
MCHC RBC-ENTMCNC: 31 GM/DL
MCV RBC AUTO: 92.8 FL
METHYLMALONATE SERPL-SCNC: 184 NMOL/L
MICROALBUMIN 24H UR DL<=1MG/L-MCNC: 1.5 MG/DL
MICROALBUMIN/CREAT 24H UR-RTO: 17 MG/G
MICROSCOPIC-UA: NORMAL
MONOCYTES # BLD AUTO: 0.64 K/UL
MONOCYTES NFR BLD AUTO: 8 %
MPO AB + PR3 PNL SER: NORMAL
NEUTROPHILS # BLD AUTO: 5.22 K/UL
NEUTROPHILS NFR BLD AUTO: 64.8 %
NITRITE URINE: NEGATIVE
PARATHYROID HORMONE INTACT: 33 PG/ML
PH URINE: 5
PHOSPHATE SERPL-MCNC: 3.7 MG/DL
PLATELET # BLD AUTO: 248 K/UL
POTASSIUM SERPL-SCNC: 4 MMOL/L
PROT SERPL-MCNC: 6.4 G/DL
PROT UR-MCNC: 8 MG/DL
PROTEIN URINE: NEGATIVE
RBC # BLD: 4 M/UL
RBC # FLD: 13.2 %
RED BLOOD CELLS URINE: 2 /HPF
RENIN PLASMA: 5.6 PG/ML
RHEUMATOID FACT SER QL: <10 IU/ML
SODIUM SERPL-SCNC: 139 MMOL/L
SPECIFIC GRAVITY URINE: 1.02
SQUAMOUS EPITHELIAL CELLS: 11 /HPF
T3FREE SERPL-MCNC: 2.58 PG/ML
T3RU NFR SERPL: 1 TBI
T4 FREE SERPL-MCNC: 1.1 NG/DL
T4 SERPL-MCNC: 6.1 UG/DL
THYROGLOB AB SERPL-ACNC: <20 IU/ML
THYROPEROXIDASE AB SERPL IA-ACNC: <10 IU/ML
TIBC SERPL-MCNC: 254 UG/DL
TRIGL SERPL-MCNC: 263 MG/DL
TSH SERPL-ACNC: 1.22 UIU/ML
UIBC SERPL-MCNC: 192 UG/DL
URATE SERPL-MCNC: 4.8 MG/DL
UROBILINOGEN URINE: NORMAL
VIT B12 SERPL-MCNC: 290 PG/ML
WBC # FLD AUTO: 8.05 K/UL
WHITE BLOOD CELLS URINE: 6 /HPF

## 2021-02-18 NOTE — PATIENT PROFILE ADULT - NSPROGENOTHERPROVIDER_GEN_A_NUR
Patient:   NOMAN RÍOS            MRN: [id number]            FIN: [id number]               Age:   70 years     Sex:  Male     :  [birth year]   Associated Diagnoses:   None   Author:   Radha PASCUAL, Iker VIDAL      Basic Information   Time seen: Date & time 10/06/17 15:50:00.   History source: Patient.   Arrival mode: Private vehicle.   History limitation: None.      History of Present Illness   The patient is a 70-year-old male with a history of hypertension who presents to the Emergency Department due to right sided abdominal/chest pain. The patient states the pain is localized near the right lower chest/right upper abdomen which radiates to the back. The patient states he has experienced the pain intermittently for the past 2.5 weeks. He states at times  experiences \"belching\" when the abdominal pain occurs. The patient denies exacerbating factors, specifically no exertional component to this, also denies noticing any relationship to eating.. He reports the pain began at 10:00 this morning and has been mildly constant since then, which prompted him to visit immediate care who referred him to the ED for further evaluation. The patient denies nausea, vomiting, or shortness of breath.      Review of Systems   Constitutional symptoms:  No fever,    Skin symptoms:  No rash,    Eye symptoms:  No recent vision problems,    ENMT symptoms:  No sore throat,    Respiratory symptoms:  No shortness of breath,    Cardiovascular symptoms:  No chest pain,    Gastrointestinal symptoms:  Abdominal pain.   Genitourinary symptoms:  No dysuria,    Musculoskeletal symptoms:  No Joint pain,    Neurologic symptoms:  No headache,              Additional review of systems information: All other systems reviewed and otherwise negative.      Health Status   Allergies: Penicillin.   Immunizations: Up to date.      Past Medical/ Family/ Social History      Medical history   Cardiovascular: hypertension.   Surgical history: Knee replacement.    Family history: Not significant.   Social history: Alcohol use: Denies, Tobacco use: Denies, Drug use: Denies, Family/social situation: , intact family.      Physical Examination               Vital Signs               Per nurse's notes.   Vital Signs were reviewed.   General:  Alert, no acute distress.    Skin:  Warm, dry.    Head:  Normocephalic, atraumatic.    Neck:  Supple, trachea midline.    Eye:  Pupils are equal, round and reactive to light, extraocular movements are intact.    Ears, nose, mouth and throat:  Oral mucosa moist.   Cardiovascular:  Regular rate and rhythm, S1, S2.    Respiratory:  Lungs are clear to auscultation, respirations are non-labored, Symmetrical chest wall expansion.    Chest wall:  No tenderness.   Back:  Nontender, Normal range of motion, Normal alignment.    Musculoskeletal:  Normal ROM, no swelling, no deformity.    Gastrointestinal:  Soft, Nontender, Non distended.    Neurological:  Alert and oriented to person, place, time, and situation, No focal neurological deficit observed.    Psychiatric:  Appropriate mood & affect.      Medical Decision Making   Differential Diagnosis:  Multiple diagnoses were considered.   Cardiac monitor:  Normal sinus rhythm.   Electrocardiogram:  Rate 56, no ectopy, normal VA & QRS intervals, EP Interp, no acute ischemic st chagnes, sinus bradycardia    Electrocardiogram::  Normal sinus rhythm, No ST-T changes, no ectopy, normal VA & QRS intervals, EP Interp.     Results review:  Lab results : Lab [facility]   10/06/17 16:25           Glucose Lvl               80 mg/dL                             BUN                       25 mg/dL  HI                             Creatinine                1.13 mg/dL                             eGFR AfrAmer              >60 mL/min/1.73m2  NA                             eGFR NonAfrAmer           >60 mL/min/1.73m2  NA                             Sodium                    135 mEq/L  LOW                              Potassium                 4.0 mEq/L                             Chloride                  102 mEq/L                             TCO2                      24 mEq/L                             AGAP                      13 mEq/L                             Calcium                   8.9 mg/dL                             Alk Phos                  81 unit/L                             Bili Total                0.7 mg/dL                             Total Protein             6.7 g/dL                             Albumin                   3.6 g/dL                             Globulin_                 3.1 g/dL                             A/G Ratio_                1.2  NA                             AST/GOT                   30 unit/L                             ALT/GPT                   29 unit/L                             Troponin I                <0.01 ng/mL                             WBC                       6.7 K/cumm                             RBC                       4.24 M/cumm                             Hgb                       13.8 g/dL                             Hct                       41 %                             MCV                       96 FL                             MCH                       32 pg                             MCHC                      34 g/dL                             RDW                       12.7 %                             Platelet                  183 K/cumm                             NRBC                      0.0 %                             Abs Neutro                4.4 K/cumm                             Neutrophil                66 %  NA                             Abs Lymph                 1.6 K/cumm                             Lymphocyte                24 %  NA                             Abs Mono                  0.6 K/cumm                             Monocyte                  8 %  NA                             Immature Gran             0.1 %                              Eosinophil                2 %                             Basophil                  0 %    .   Chest X-Ray:  No acute disease process.   Radiology results:  Ultrasound, interpretation:  alfred For Exam  ruq abd pain    Report  US Gallbladder    TECHNIQUE: US Gallbladder    CLINICAL INDICATION:  ruq abd pain. Abdominal pain.    COMPARISON: None.    FINDINGS:    There is no evidence for gallstones.    Gallbladder wall is not thickened. There is no abnormal fluid collection surrounding the gallbladder. There is no evidence for sonographic Matos's sign.    There is no evidence for biliary dilatation. Common bile duct measurement in the millimeters : 3.    IMPRESSION:    Unremarkable study., * Final Report *    Reason For Exam  RUQ pain traditiatng into back    Report  EXAMINATION: CT Abdomen + Pelvis w / Contrast.    CLINICAL INDICATION: RUQ pain traditiatng into back.    COMPARISON: None.    Multiple axial images of the abdomen and pelvis were obtained from the lung bases to the ischial tuberosities, following administration of intravenous contrast material. In addition, coronal reformatted images were obtained.  Adjustment of the mA and/or kV was done based on the patient's size.    CONTRAST: 75 mL Omnipaque 350    The lung bases show no infiltrates or pleural effusions.  Heart is normal in size.    The liver shows no focal mass or biliary dilatation.  The gallbladder is mildly contracted.  The pancreas, spleen and adrenal glands are unremarkable.  Kidneys enhance symmetrically.  No hydronephrosis or stones are seen.    Evaluation of the bowel loops shows no evidence of bowel obstruction.  The appendix is normal.  There is no colitis or diverticulitis.  A few fluid-filled distal small bowel loops are seen suggestive of enteritis.    IMPRESSION: No appendicitis colitis or diverticulitis.  Enteritis is suspected.  No bowel obstruction.  No hydronephrosis..    .    Notes:  Patient seen and evaluated multiple  diagnoses were considered.  EKG was obtained prior to my evaluation is reviewed no acute abnormalities.  On my exam patient seemed to have some minimal tenderness to palpation in the intercostal musculature the right lower ribs though this is not consistent.  He does not have any abdominal tenderness, negative Matos's.  Labs returned without any significant abnormalities, ultrasound gallbladder unremarkable, chest x-ray normal, CT scan abdomen pelvis read as questionable enteritis, no inflammatory changes on the CT scan, patient not having any diarrhea and no fevers chills nausea vomiting or leukocytosis therefore do not believe clinically patient has an enteritis.  Pain is not pleuritic, he does not have any dyspnea, he has no risk factors of PE normal pulse ox and I believe any further workup is warranted in regards to PE as this does not fit the clinical picture.  This pain is very intermittent but lasts many hours at a time, today has been there for over 6 hours constantly, though it goes completely away in between episodes therefore do not believe this to be an early herpes zoster infection, no rash is present.  This point time I believe any emergent or life threats have been ruled out will discharge patient home with prescription for ibuprofen for pain Norco for breakthrough pain is to follow-up with his primary care physician for reevaluation, patient is to return to emergency room for worsening symptoms or new concerns is comfort with this plan.`.      Reexamination/ Reevaluation   Course: well controlled.   Pain status: decreased.      Impression and Plan   Diagnosis   Right thoarcoabdominal pain   Plan   Condition: Stable.    Disposition: Discharged: Time  10/06/17 18:57:00, to home.    Prescriptions: [facility] prescriptions   Pharmacy:  [facility] 325 mg-5 mg oral tablet (Prescribe): See Instructions, 1-2 tabs PO q4-6 hr PRN, PRN:  for pain, 15 tab, 0 Refill(s)  ibuprofen 600 mg oral tablet (Prescribe): 600 mg,  1 tab(s), PO, q6hr, PRN:  as needed for pain, 15 tab(s), 0 Refill(s)  .    Patient was given the following educational materials: Abdominal Pain, Adult, Chest Pain (Nonspecific).    Follow up with: Follow up with primary care provider Within 3 to 5 days Call for follow up appointment  Return if symptoms worsen or any new concerns. Take  Ibuprofen for pain and Norco for breakthrough pain..    Counseled: Patient, Regarding diagnosis, Regarding treatment plan.    Notes: This document is scribed by Susie Cowart for Dr. Garcia. , \"All medical record entries made by the scribe were at my direction. I have reviewed the chart and agree that the record accurately reflects my personal performance of the history, physical exam, medical decision making, and the emergency department course for this patient. I have also personally directed, reviewed, and agree with the discharge instructions and disposition.\".         none

## 2022-12-15 NOTE — ED ADULT NURSE NOTE - CHPI ED NUR DURATION
Final Anesthesia Post-op Assessment    Patient: Laly Agosto  Procedure(s) Performed: PARS PLANA VITRECTOMY WITH REMOVAL OF DISLOCATED LENS AND INSERTION OF SECONDARY LENS RIGHT EYE - RIGHT  Anesthesia type: General    Vitals Value Taken Time   Temp 36.3 °C (97.3 °F) 12/15/22 1332   Pulse 55 12/15/22 1332   Resp 18 12/15/22 1332   SpO2 98 % 12/15/22 1332   /69 12/15/22 1332         Patient Location: PACU Phase 1  Post-op Vital Signs:stable  Level of Consciousness: awake and alert  Respiratory Status: spontaneous ventilation  Cardiovascular stable  Hydration: euvolemic  Pain Management: adequately controlled  Handoff: Handoff to receiving clinician was performed and questions were answered  Vomiting: none  Nausea: None  Airway Patency:patent  Post-op Assessment: no complications and patient tolerated procedure well with no complications      No notable events documented.   
2/day(s)

## 2024-02-01 NOTE — ADDENDUM
[FreeTextEntry1] : I, Stevenson Perry, acted solely as a scribe for Dr. Armin Caputo on this date 05/22/2019. 
Detail Level: Zone

## 2024-02-18 NOTE — PHYSICAL THERAPY INITIAL EVALUATION ADULT - GAIT DISTANCE, PT EVAL
100 feet
PA NOTE: GEN: AOX3, NAD. HEENT: Throat clear. Airway is patent. EYES: PERRLA. EOMI. Head: NC/AT. NECK: Supple, +Mild MSK tenderness paravertebral tenderness. Mild pain with FROM. C-spine non-tender. CV:S1S2, RRR, LUNGS: Non-labored breathing, no tachypnea. O2sat 100% RA. CTA b/l. No w/r/r. CHEST: Equal chest expansion and rise. No deformity. ABD: Soft, NT/ND, no rebound, no guarding. No CVAT. EXT: No e/c/c. 2+ distal pulses. SKIN: No rashes. NEURO: No focal deficits. CN II-XII intact. FROM. 5/5 motor and sensory. ~Keenan Lemus PA-C

## 2024-03-26 NOTE — PHYSICAL THERAPY INITIAL EVALUATION ADULT - LIVES WITH, PROFILE
History of TBI at 6 years old  Experiences memory issues at baseline  Home regimen: Ambien risperidone  Plan:  Hold home Ambien and risperidone while IP   spouse/house, 3 steps to enter, 1 flight of 10 stairs to 2nd story bedroom

## 2025-03-18 ENCOUNTER — RESULT REVIEW (OUTPATIENT)
Age: 75
End: 2025-03-18

## 2025-03-19 ENCOUNTER — TRANSCRIPTION ENCOUNTER (OUTPATIENT)
Age: 75
End: 2025-03-19

## 2025-03-21 ENCOUNTER — RESULT REVIEW (OUTPATIENT)
Age: 75
End: 2025-03-21

## 2025-03-24 ENCOUNTER — TRANSCRIPTION ENCOUNTER (OUTPATIENT)
Age: 75
End: 2025-03-24

## 2025-03-25 ENCOUNTER — TRANSCRIPTION ENCOUNTER (OUTPATIENT)
Age: 75
End: 2025-03-25

## 2025-03-27 ENCOUNTER — TRANSCRIPTION ENCOUNTER (OUTPATIENT)
Age: 75
End: 2025-03-27

## 2025-03-31 VITALS
HEIGHT: 65 IN | TEMPERATURE: 98 F | RESPIRATION RATE: 17 BRPM | WEIGHT: 85.1 LBS | DIASTOLIC BLOOD PRESSURE: 81 MMHG | OXYGEN SATURATION: 97 % | HEART RATE: 106 BPM | SYSTOLIC BLOOD PRESSURE: 127 MMHG

## 2025-03-31 LAB
ADD ON TEST-SPECIMEN IN LAB: SIGNIFICANT CHANGE UP
ADD ON TEST-SPECIMEN IN LAB: SIGNIFICANT CHANGE UP
ALBUMIN SERPL ELPH-MCNC: 2.7 G/DL — LOW (ref 3.3–5)
ALP SERPL-CCNC: 340 U/L — HIGH (ref 40–120)
ALT FLD-CCNC: 69 U/L — HIGH (ref 10–45)
ANION GAP SERPL CALC-SCNC: 8 MMOL/L — SIGNIFICANT CHANGE UP (ref 5–17)
APPEARANCE UR: ABNORMAL
APTT BLD: 34.6 SEC — SIGNIFICANT CHANGE UP (ref 24.5–35.6)
AST SERPL-CCNC: 36 U/L — SIGNIFICANT CHANGE UP (ref 10–40)
BASE EXCESS BLDA CALC-SCNC: 5.2 MMOL/L — HIGH (ref -2–3)
BASOPHILS # BLD AUTO: 0 K/UL — SIGNIFICANT CHANGE UP (ref 0–0.2)
BASOPHILS NFR BLD AUTO: 0 % — SIGNIFICANT CHANGE UP (ref 0–2)
BILIRUB SERPL-MCNC: 0.2 MG/DL — SIGNIFICANT CHANGE UP (ref 0.2–1.2)
BILIRUB UR-MCNC: NEGATIVE — SIGNIFICANT CHANGE UP
BLD GP AB SCN SERPL QL: NEGATIVE — SIGNIFICANT CHANGE UP
BUN SERPL-MCNC: 33 MG/DL — HIGH (ref 7–23)
CALCIUM SERPL-MCNC: 8.6 MG/DL — SIGNIFICANT CHANGE UP (ref 8.4–10.5)
CHLORIDE SERPL-SCNC: 104 MMOL/L — SIGNIFICANT CHANGE UP (ref 96–108)
CO2 BLDA-SCNC: 31 MMOL/L — HIGH (ref 19–24)
CO2 SERPL-SCNC: 32 MMOL/L — HIGH (ref 22–31)
COLOR SPEC: YELLOW — SIGNIFICANT CHANGE UP
CREAT SERPL-MCNC: 0.32 MG/DL — LOW (ref 0.5–1.3)
DIFF PNL FLD: ABNORMAL
EGFR: 108 ML/MIN/1.73M2 — SIGNIFICANT CHANGE UP
EGFR: 108 ML/MIN/1.73M2 — SIGNIFICANT CHANGE UP
EOSINOPHIL # BLD AUTO: 0.09 K/UL — SIGNIFICANT CHANGE UP (ref 0–0.5)
EOSINOPHIL NFR BLD AUTO: 0.8 % — SIGNIFICANT CHANGE UP (ref 0–6)
FLUAV AG NPH QL: SIGNIFICANT CHANGE UP
FLUBV AG NPH QL: SIGNIFICANT CHANGE UP
GAS PNL BLDA: SIGNIFICANT CHANGE UP
GAS PNL BLDV: SIGNIFICANT CHANGE UP
GLUCOSE SERPL-MCNC: 155 MG/DL — HIGH (ref 70–99)
GLUCOSE UR QL: NEGATIVE MG/DL — SIGNIFICANT CHANGE UP
HCO3 BLDA-SCNC: 30 MMOL/L — HIGH (ref 21–28)
HCT VFR BLD CALC: 25.6 % — LOW (ref 34.5–45)
HGB BLD-MCNC: 7.8 G/DL — LOW (ref 11.5–15.5)
INR BLD: 1.63 — HIGH (ref 0.85–1.16)
KETONES UR-MCNC: NEGATIVE MG/DL — SIGNIFICANT CHANGE UP
LACTATE SERPL-SCNC: 1 MMOL/L — SIGNIFICANT CHANGE UP (ref 0.5–2)
LEUKOCYTE ESTERASE UR-ACNC: ABNORMAL
LYMPHOCYTES # BLD AUTO: 0.1 K/UL — LOW (ref 1–3.3)
LYMPHOCYTES # BLD AUTO: 0.9 % — LOW (ref 13–44)
MCHC RBC-ENTMCNC: 27.4 PG — SIGNIFICANT CHANGE UP (ref 27–34)
MCHC RBC-ENTMCNC: 30.5 G/DL — LOW (ref 32–36)
MCV RBC AUTO: 89.8 FL — SIGNIFICANT CHANGE UP (ref 80–100)
MONOCYTES # BLD AUTO: 0.75 K/UL — SIGNIFICANT CHANGE UP (ref 0–0.9)
MONOCYTES NFR BLD AUTO: 6.9 % — SIGNIFICANT CHANGE UP (ref 2–14)
NEUTROPHILS # BLD AUTO: 9.99 K/UL — HIGH (ref 1.8–7.4)
NEUTROPHILS NFR BLD AUTO: 91.4 % — HIGH (ref 43–77)
NITRITE UR-MCNC: NEGATIVE — SIGNIFICANT CHANGE UP
OB PNL STL: POSITIVE
PCO2 BLDA: 43 MMHG — SIGNIFICANT CHANGE UP (ref 32–45)
PH BLDA: 7.45 — SIGNIFICANT CHANGE UP (ref 7.35–7.45)
PH UR: 6.5 — SIGNIFICANT CHANGE UP (ref 5–8)
PLATELET # BLD AUTO: 302 K/UL — SIGNIFICANT CHANGE UP (ref 150–400)
PO2 BLDA: 104 MMHG — SIGNIFICANT CHANGE UP (ref 83–108)
POTASSIUM SERPL-MCNC: 4.4 MMOL/L — SIGNIFICANT CHANGE UP (ref 3.5–5.3)
POTASSIUM SERPL-SCNC: 4.4 MMOL/L — SIGNIFICANT CHANGE UP (ref 3.5–5.3)
PROT SERPL-MCNC: 4.5 G/DL — LOW (ref 6–8.3)
PROT UR-MCNC: 30 MG/DL
PROTHROM AB SERPL-ACNC: 18.7 SEC — HIGH (ref 9.9–13.4)
RBC # BLD: 2.85 M/UL — LOW (ref 3.8–5.2)
RBC # FLD: 22 % — HIGH (ref 10.3–14.5)
RH IG SCN BLD-IMP: POSITIVE — SIGNIFICANT CHANGE UP
RSV RNA NPH QL NAA+NON-PROBE: SIGNIFICANT CHANGE UP
SAO2 % BLDA: 99 % — HIGH (ref 94–98)
SARS-COV-2 RNA SPEC QL NAA+PROBE: SIGNIFICANT CHANGE UP
SODIUM SERPL-SCNC: 144 MMOL/L — SIGNIFICANT CHANGE UP (ref 135–145)
SOURCE RESPIRATORY: SIGNIFICANT CHANGE UP
SP GR SPEC: 1.02 — SIGNIFICANT CHANGE UP (ref 1–1.03)
UROBILINOGEN FLD QL: 0.2 MG/DL — SIGNIFICANT CHANGE UP (ref 0.2–1)
WBC # BLD: 10.93 K/UL — HIGH (ref 3.8–10.5)
WBC # FLD AUTO: 10.93 K/UL — HIGH (ref 3.8–10.5)

## 2025-03-31 PROCEDURE — 71045 X-RAY EXAM CHEST 1 VIEW: CPT | Mod: 26

## 2025-03-31 PROCEDURE — 93308 TTE F-UP OR LMTD: CPT | Mod: 26

## 2025-03-31 PROCEDURE — 99284 EMERGENCY DEPT VISIT MOD MDM: CPT | Mod: GC

## 2025-03-31 PROCEDURE — 99291 CRITICAL CARE FIRST HOUR: CPT

## 2025-03-31 PROCEDURE — 93010 ELECTROCARDIOGRAM REPORT: CPT

## 2025-03-31 RX ORDER — CEFEPIME 2 G/20ML
2000 INJECTION, POWDER, FOR SOLUTION INTRAVENOUS ONCE
Refills: 0 | Status: COMPLETED | OUTPATIENT
Start: 2025-03-31 | End: 2025-03-31

## 2025-03-31 RX ORDER — ACETAMINOPHEN 500 MG/5ML
1000 LIQUID (ML) ORAL ONCE
Refills: 0 | Status: COMPLETED | OUTPATIENT
Start: 2025-03-31 | End: 2025-03-31

## 2025-03-31 RX ORDER — AZITHROMYCIN 250 MG
500 CAPSULE ORAL ONCE
Refills: 0 | Status: COMPLETED | OUTPATIENT
Start: 2025-03-31 | End: 2025-03-31

## 2025-03-31 RX ORDER — IPRATROPIUM BROMIDE AND ALBUTEROL SULFATE .5; 2.5 MG/3ML; MG/3ML
3 SOLUTION RESPIRATORY (INHALATION) ONCE
Refills: 0 | Status: COMPLETED | OUTPATIENT
Start: 2025-03-31 | End: 2025-03-31

## 2025-03-31 RX ADMIN — Medication 1000 MILLILITER(S): at 22:37

## 2025-03-31 RX ADMIN — CEFEPIME 100 MILLIGRAM(S): 2 INJECTION, POWDER, FOR SOLUTION INTRAVENOUS at 21:08

## 2025-03-31 RX ADMIN — Medication 400 MILLIGRAM(S): at 21:08

## 2025-03-31 RX ADMIN — IPRATROPIUM BROMIDE AND ALBUTEROL SULFATE 3 MILLILITER(S): .5; 2.5 SOLUTION RESPIRATORY (INHALATION) at 23:05

## 2025-03-31 RX ADMIN — Medication 500 MILLILITER(S): at 21:08

## 2025-03-31 RX ADMIN — Medication 40 MILLIGRAM(S): at 21:38

## 2025-04-01 ENCOUNTER — INPATIENT (INPATIENT)
Facility: HOSPITAL | Age: 75
LOS: 3 days | Discharge: HOME | DRG: 193 | End: 2025-04-05
Attending: STUDENT IN AN ORGANIZED HEALTH CARE EDUCATION/TRAINING PROGRAM | Admitting: INTERNAL MEDICINE
Payer: MEDICARE

## 2025-04-01 ENCOUNTER — RESULT REVIEW (OUTPATIENT)
Age: 75
End: 2025-04-01

## 2025-04-01 DIAGNOSIS — I26.99 OTHER PULMONARY EMBOLISM WITHOUT ACUTE COR PULMONALE: ICD-10-CM

## 2025-04-01 DIAGNOSIS — R65.20 SEVERE SEPSIS WITHOUT SEPTIC SHOCK: ICD-10-CM

## 2025-04-01 DIAGNOSIS — J98.11 ATELECTASIS: ICD-10-CM

## 2025-04-01 DIAGNOSIS — Z98.890 OTHER SPECIFIED POSTPROCEDURAL STATES: Chronic | ICD-10-CM

## 2025-04-01 LAB
A1C WITH ESTIMATED AVERAGE GLUCOSE RESULT: 7.7 % — HIGH (ref 4–5.6)
ALBUMIN SERPL ELPH-MCNC: 2.4 G/DL — LOW (ref 3.3–5)
ALP SERPL-CCNC: 297 U/L — HIGH (ref 40–120)
ALT FLD-CCNC: 61 U/L — HIGH (ref 10–45)
ANION GAP SERPL CALC-SCNC: 7 MMOL/L — SIGNIFICANT CHANGE UP (ref 5–17)
APTT BLD: 56.8 SEC — HIGH (ref 24.5–35.6)
AST SERPL-CCNC: 25 U/L — SIGNIFICANT CHANGE UP (ref 10–40)
BASE EXCESS BLDA CALC-SCNC: 5.6 MMOL/L — HIGH (ref -2–3)
BASOPHILS # BLD AUTO: 0.04 K/UL — SIGNIFICANT CHANGE UP (ref 0–0.2)
BASOPHILS NFR BLD AUTO: 0.3 % — SIGNIFICANT CHANGE UP (ref 0–2)
BILIRUB SERPL-MCNC: 0.5 MG/DL — SIGNIFICANT CHANGE UP (ref 0.2–1.2)
BLD GP AB SCN SERPL QL: NEGATIVE — SIGNIFICANT CHANGE UP
BUN SERPL-MCNC: 26 MG/DL — HIGH (ref 7–23)
CALCIUM SERPL-MCNC: 8 MG/DL — LOW (ref 8.4–10.5)
CHLORIDE SERPL-SCNC: 106 MMOL/L — SIGNIFICANT CHANGE UP (ref 96–108)
CK MB CFR SERPL CALC: 5.1 NG/ML — SIGNIFICANT CHANGE UP (ref 0–6.7)
CK SERPL-CCNC: 116 U/L — SIGNIFICANT CHANGE UP (ref 25–170)
CO2 BLDA-SCNC: 32 MMOL/L — HIGH (ref 19–24)
CO2 SERPL-SCNC: 29 MMOL/L — SIGNIFICANT CHANGE UP (ref 22–31)
CREAT SERPL-MCNC: 0.27 MG/DL — LOW (ref 0.5–1.3)
CREAT SERPL-MCNC: 0.3 MG/DL — LOW (ref 0.5–1.3)
CYSTATIN C SERPL-MCNC: 1.31 MG/L — SIGNIFICANT CHANGE UP (ref 0.68–1.36)
EGFR: 110 ML/MIN/1.73M2 — SIGNIFICANT CHANGE UP
EGFR: 110 ML/MIN/1.73M2 — SIGNIFICANT CHANGE UP
EGFR: 113 ML/MIN/1.73M2 — SIGNIFICANT CHANGE UP
EGFR: 113 ML/MIN/1.73M2 — SIGNIFICANT CHANGE UP
EGFRCR-CYS SERPLBLD CKD-EPI 2021: 79 ML/MIN/1.73M2 — SIGNIFICANT CHANGE UP
EOSINOPHIL # BLD AUTO: 0.24 K/UL — SIGNIFICANT CHANGE UP (ref 0–0.5)
EOSINOPHIL NFR BLD AUTO: 2 % — SIGNIFICANT CHANGE UP (ref 0–6)
ESTIMATED AVERAGE GLUCOSE: 174 MG/DL — HIGH (ref 68–114)
FERRITIN SERPL-MCNC: 636 NG/ML — HIGH (ref 13–330)
FOLATE SERPL-MCNC: 7.6 NG/ML — SIGNIFICANT CHANGE UP
GFR/BSA.PRED SERPLBLD CYS-BASED-ARV: 47 ML/MIN/1.73M2 — LOW
GGT SERPL-CCNC: 186 U/L — HIGH (ref 8–40)
GGT SERPL-CCNC: 186 U/L — HIGH (ref 8–40)
GLUCOSE SERPL-MCNC: 204 MG/DL — HIGH (ref 70–99)
HCO3 BLDA-SCNC: 31 MMOL/L — HIGH (ref 21–28)
HCT VFR BLD CALC: 20.9 % — CRITICAL LOW (ref 34.5–45)
HCT VFR BLD CALC: 25.8 % — LOW (ref 34.5–45)
HCT VFR BLD CALC: 32 % — LOW (ref 34.5–45)
HGB BLD-MCNC: 10.6 G/DL — LOW (ref 11.5–15.5)
HGB BLD-MCNC: 6.4 G/DL — CRITICAL LOW (ref 11.5–15.5)
HGB BLD-MCNC: 8.3 G/DL — LOW (ref 11.5–15.5)
IMM GRANULOCYTES NFR BLD AUTO: 0.9 % — SIGNIFICANT CHANGE UP (ref 0–0.9)
IRON SATN MFR SERPL: 31 UG/DL — SIGNIFICANT CHANGE UP (ref 30–160)
IRON SATN MFR SERPL: 34 % — SIGNIFICANT CHANGE UP (ref 14–50)
LACTATE SERPL-SCNC: 1.1 MMOL/L — SIGNIFICANT CHANGE UP (ref 0.5–2)
LDH SERPL L TO P-CCNC: 289 U/L — HIGH (ref 50–242)
LEGIONELLA AG UR QL: NEGATIVE — SIGNIFICANT CHANGE UP
LYMPHOCYTES # BLD AUTO: 0.59 K/UL — LOW (ref 1–3.3)
LYMPHOCYTES # BLD AUTO: 5 % — LOW (ref 13–44)
MAGNESIUM SERPL-MCNC: 1.9 MG/DL — SIGNIFICANT CHANGE UP (ref 1.6–2.6)
MCHC RBC-ENTMCNC: 27.4 PG — SIGNIFICANT CHANGE UP (ref 27–34)
MCHC RBC-ENTMCNC: 29.4 PG — SIGNIFICANT CHANGE UP (ref 27–34)
MCHC RBC-ENTMCNC: 29.5 PG — SIGNIFICANT CHANGE UP (ref 27–34)
MCHC RBC-ENTMCNC: 30.6 G/DL — LOW (ref 32–36)
MCHC RBC-ENTMCNC: 32.2 G/DL — SIGNIFICANT CHANGE UP (ref 32–36)
MCHC RBC-ENTMCNC: 33.1 G/DL — SIGNIFICANT CHANGE UP (ref 32–36)
MCV RBC AUTO: 88.6 FL — SIGNIFICANT CHANGE UP (ref 80–100)
MCV RBC AUTO: 89.3 FL — SIGNIFICANT CHANGE UP (ref 80–100)
MCV RBC AUTO: 91.8 FL — SIGNIFICANT CHANGE UP (ref 80–100)
MONOCYTES # BLD AUTO: 1.22 K/UL — HIGH (ref 0–0.9)
MONOCYTES NFR BLD AUTO: 10.3 % — SIGNIFICANT CHANGE UP (ref 2–14)
MRSA PCR RESULT.: POSITIVE
NEUTROPHILS # BLD AUTO: 9.68 K/UL — HIGH (ref 1.8–7.4)
NEUTROPHILS NFR BLD AUTO: 81.5 % — HIGH (ref 43–77)
NRBC BLD AUTO-RTO: 0 /100 WBCS — SIGNIFICANT CHANGE UP (ref 0–0)
PCO2 BLDA: 45 MMHG — SIGNIFICANT CHANGE UP (ref 32–45)
PH BLDA: 7.44 — SIGNIFICANT CHANGE UP (ref 7.35–7.45)
PHOSPHATE SERPL-MCNC: 2.6 MG/DL — SIGNIFICANT CHANGE UP (ref 2.5–4.5)
PLATELET # BLD AUTO: 265 K/UL — SIGNIFICANT CHANGE UP (ref 150–400)
PLATELET # BLD AUTO: 284 K/UL — SIGNIFICANT CHANGE UP (ref 150–400)
PLATELET # BLD AUTO: 287 K/UL — SIGNIFICANT CHANGE UP (ref 150–400)
PO2 BLDA: 141 MMHG — HIGH (ref 83–108)
POTASSIUM SERPL-MCNC: 3.7 MMOL/L — SIGNIFICANT CHANGE UP (ref 3.5–5.3)
POTASSIUM SERPL-SCNC: 3.7 MMOL/L — SIGNIFICANT CHANGE UP (ref 3.5–5.3)
PROT SERPL-MCNC: 4.3 G/DL — LOW (ref 6–8.3)
RBC # BLD: 2.34 M/UL — LOW (ref 3.8–5.2)
RBC # BLD: 2.81 M/UL — LOW (ref 3.8–5.2)
RBC # BLD: 2.84 M/UL — LOW (ref 3.8–5.2)
RBC # BLD: 3.61 M/UL — LOW (ref 3.8–5.2)
RBC # FLD: 19.1 % — HIGH (ref 10.3–14.5)
RBC # FLD: 19.8 % — HIGH (ref 10.3–14.5)
RBC # FLD: 21.8 % — HIGH (ref 10.3–14.5)
RETICS #: 43.2 K/UL — SIGNIFICANT CHANGE UP (ref 25–125)
RETICS/RBC NFR: 1.5 % — SIGNIFICANT CHANGE UP (ref 0.5–2.5)
RH IG SCN BLD-IMP: POSITIVE — SIGNIFICANT CHANGE UP
RH IG SCN BLD-IMP: POSITIVE — SIGNIFICANT CHANGE UP
S AUREUS DNA NOSE QL NAA+PROBE: POSITIVE
S PNEUM AG UR QL: NEGATIVE — SIGNIFICANT CHANGE UP
SAO2 % BLDA: 99.5 % — HIGH (ref 94–98)
SODIUM SERPL-SCNC: 142 MMOL/L — SIGNIFICANT CHANGE UP (ref 135–145)
TIBC SERPL-MCNC: 92 UG/DL — LOW (ref 220–430)
TRANSFERRIN SERPL-MCNC: 66 MG/DL — LOW (ref 200–360)
TROPONIN T, HIGH SENSITIVITY RESULT: 132 NG/L — CRITICAL HIGH (ref 0–51)
TROPONIN T, HIGH SENSITIVITY RESULT: 142 NG/L — CRITICAL HIGH (ref 0–51)
UIBC SERPL-MCNC: 61 UG/DL — LOW (ref 110–370)
VANCOMYCIN FLD-MCNC: 8.4 UG/ML — SIGNIFICANT CHANGE UP
VIT B12 SERPL-MCNC: 1239 PG/ML — SIGNIFICANT CHANGE UP (ref 232–1245)
WBC # BLD: 10.59 K/UL — HIGH (ref 3.8–10.5)
WBC # BLD: 11.2 K/UL — HIGH (ref 3.8–10.5)
WBC # BLD: 11.88 K/UL — HIGH (ref 3.8–10.5)
WBC # FLD AUTO: 10.59 K/UL — HIGH (ref 3.8–10.5)
WBC # FLD AUTO: 11.2 K/UL — HIGH (ref 3.8–10.5)
WBC # FLD AUTO: 11.88 K/UL — HIGH (ref 3.8–10.5)

## 2025-04-01 PROCEDURE — G0545: CPT

## 2025-04-01 PROCEDURE — 99222 1ST HOSP IP/OBS MODERATE 55: CPT

## 2025-04-01 PROCEDURE — 93306 TTE W/DOPPLER COMPLETE: CPT | Mod: 26

## 2025-04-01 PROCEDURE — 76604 US EXAM CHEST: CPT | Mod: 26

## 2025-04-01 PROCEDURE — 99223 1ST HOSP IP/OBS HIGH 75: CPT

## 2025-04-01 PROCEDURE — ZZZZZ: CPT

## 2025-04-01 RX ORDER — ESCITALOPRAM OXALATE 20 MG/1
10 TABLET ORAL AT BEDTIME
Refills: 0 | Status: DISCONTINUED | OUTPATIENT
Start: 2025-04-01 | End: 2025-04-05

## 2025-04-01 RX ORDER — HALOPERIDOL 10 MG/1
2 TABLET ORAL ONCE
Refills: 0 | Status: COMPLETED | OUTPATIENT
Start: 2025-04-01 | End: 2025-04-01

## 2025-04-01 RX ORDER — SODIUM CHLORIDE 9 G/1000ML
1000 INJECTION, SOLUTION INTRAVENOUS
Refills: 0 | Status: DISCONTINUED | OUTPATIENT
Start: 2025-04-01 | End: 2025-04-03

## 2025-04-01 RX ORDER — SODIUM CHLORIDE 9 G/1000ML
1000 INJECTION, SOLUTION INTRAVENOUS
Refills: 0 | Status: DISCONTINUED | OUTPATIENT
Start: 2025-04-01 | End: 2025-04-01

## 2025-04-01 RX ORDER — INSULIN LISPRO 100 U/ML
INJECTION, SOLUTION INTRAVENOUS; SUBCUTANEOUS
Refills: 0 | Status: DISCONTINUED | OUTPATIENT
Start: 2025-04-01 | End: 2025-04-05

## 2025-04-01 RX ORDER — APIXABAN 2.5 MG/1
5 TABLET, FILM COATED ORAL EVERY 12 HOURS
Refills: 0 | Status: DISCONTINUED | OUTPATIENT
Start: 2025-04-01 | End: 2025-04-01

## 2025-04-01 RX ORDER — IPRATROPIUM BROMIDE AND ALBUTEROL SULFATE .5; 2.5 MG/3ML; MG/3ML
3 SOLUTION RESPIRATORY (INHALATION) EVERY 6 HOURS
Refills: 0 | Status: DISCONTINUED | OUTPATIENT
Start: 2025-04-01 | End: 2025-04-05

## 2025-04-01 RX ORDER — VANCOMYCIN HCL IN 5 % DEXTROSE 1.5G/250ML
500 PLASTIC BAG, INJECTION (ML) INTRAVENOUS EVERY 24 HOURS
Refills: 0 | Status: DISCONTINUED | OUTPATIENT
Start: 2025-04-01 | End: 2025-04-01

## 2025-04-01 RX ORDER — LEVETIRACETAM 10 MG/ML
500 INJECTION, SOLUTION INTRAVENOUS EVERY 12 HOURS
Refills: 0 | Status: DISCONTINUED | OUTPATIENT
Start: 2025-04-01 | End: 2025-04-05

## 2025-04-01 RX ORDER — GLUCAGON 3 MG/1
1 POWDER NASAL ONCE
Refills: 0 | Status: DISCONTINUED | OUTPATIENT
Start: 2025-04-01 | End: 2025-04-05

## 2025-04-01 RX ORDER — AZITHROMYCIN 250 MG
500 CAPSULE ORAL EVERY 24 HOURS
Refills: 0 | Status: DISCONTINUED | OUTPATIENT
Start: 2025-04-01 | End: 2025-04-01

## 2025-04-01 RX ORDER — VANCOMYCIN HCL IN 5 % DEXTROSE 1.5G/250ML
1000 PLASTIC BAG, INJECTION (ML) INTRAVENOUS EVERY 24 HOURS
Refills: 0 | Status: COMPLETED | OUTPATIENT
Start: 2025-04-01 | End: 2025-04-03

## 2025-04-01 RX ORDER — PIPERACILLIN-TAZO-DEXTROSE,ISO 3.375G/5
4.5 IV SOLUTION, PIGGYBACK PREMIX FROZEN(ML) INTRAVENOUS ONCE
Refills: 0 | Status: COMPLETED | OUTPATIENT
Start: 2025-04-02 | End: 2025-04-02

## 2025-04-01 RX ORDER — DEXTROSE 50 % IN WATER 50 %
25 SYRINGE (ML) INTRAVENOUS ONCE
Refills: 0 | Status: DISCONTINUED | OUTPATIENT
Start: 2025-04-01 | End: 2025-04-03

## 2025-04-01 RX ORDER — CEFEPIME 2 G/20ML
2000 INJECTION, POWDER, FOR SOLUTION INTRAVENOUS EVERY 8 HOURS
Refills: 0 | Status: DISCONTINUED | OUTPATIENT
Start: 2025-04-01 | End: 2025-04-01

## 2025-04-01 RX ORDER — HEPARIN SODIUM 1000 [USP'U]/ML
700 INJECTION INTRAVENOUS; SUBCUTANEOUS
Qty: 25000 | Refills: 0 | Status: DISCONTINUED | OUTPATIENT
Start: 2025-04-01 | End: 2025-04-04

## 2025-04-01 RX ORDER — DEXTROSE 50 % IN WATER 50 %
15 SYRINGE (ML) INTRAVENOUS ONCE
Refills: 0 | Status: DISCONTINUED | OUTPATIENT
Start: 2025-04-01 | End: 2025-04-03

## 2025-04-01 RX ORDER — SILVER SULFADIAZINE 1 %
1 CREAM (GRAM) TOPICAL
Refills: 0 | Status: DISCONTINUED | OUTPATIENT
Start: 2025-04-01 | End: 2025-04-05

## 2025-04-01 RX ORDER — POLYETHYLENE GLYCOL 3350 17 G/17G
17 POWDER, FOR SOLUTION ORAL AT BEDTIME
Refills: 0 | Status: DISCONTINUED | OUTPATIENT
Start: 2025-04-01 | End: 2025-04-05

## 2025-04-01 RX ORDER — HALOPERIDOL 10 MG/1
1 TABLET ORAL EVERY 6 HOURS
Refills: 0 | Status: DISCONTINUED | OUTPATIENT
Start: 2025-04-01 | End: 2025-04-03

## 2025-04-01 RX ORDER — FUROSEMIDE 10 MG/ML
40 INJECTION INTRAMUSCULAR; INTRAVENOUS ONCE
Refills: 0 | Status: COMPLETED | OUTPATIENT
Start: 2025-04-01 | End: 2025-04-01

## 2025-04-01 RX ORDER — METRONIDAZOLE 250 MG
500 TABLET ORAL EVERY 8 HOURS
Refills: 0 | Status: DISCONTINUED | OUTPATIENT
Start: 2025-04-01 | End: 2025-04-01

## 2025-04-01 RX ORDER — CEFTRIAXONE 500 MG/1
2 INJECTION, POWDER, FOR SOLUTION INTRAMUSCULAR; INTRAVENOUS EVERY 24 HOURS
Refills: 0 | Status: DISCONTINUED | OUTPATIENT
Start: 2025-04-01 | End: 2025-04-01

## 2025-04-01 RX ORDER — PIPERACILLIN-TAZO-DEXTROSE,ISO 3.375G/5
4.5 IV SOLUTION, PIGGYBACK PREMIX FROZEN(ML) INTRAVENOUS EVERY 8 HOURS
Refills: 0 | Status: DISCONTINUED | OUTPATIENT
Start: 2025-04-02 | End: 2025-04-05

## 2025-04-01 RX ORDER — PIPERACILLIN-TAZO-DEXTROSE,ISO 3.375G/5
4.5 IV SOLUTION, PIGGYBACK PREMIX FROZEN(ML) INTRAVENOUS ONCE
Refills: 0 | Status: COMPLETED | OUTPATIENT
Start: 2025-04-01 | End: 2025-04-01

## 2025-04-01 RX ORDER — ROSUVASTATIN CALCIUM 20 MG/1
10 TABLET, FILM COATED ORAL AT BEDTIME
Refills: 0 | Status: DISCONTINUED | OUTPATIENT
Start: 2025-04-01 | End: 2025-04-05

## 2025-04-01 RX ORDER — ESCITALOPRAM OXALATE 20 MG/1
10 TABLET ORAL AT BEDTIME
Refills: 0 | Status: DISCONTINUED | OUTPATIENT
Start: 2025-04-01 | End: 2025-04-01

## 2025-04-01 RX ORDER — ACETAMINOPHEN 500 MG/5ML
1000 LIQUID (ML) ORAL EVERY 6 HOURS
Refills: 0 | Status: DISCONTINUED | OUTPATIENT
Start: 2025-04-01 | End: 2025-04-01

## 2025-04-01 RX ORDER — ACETAMINOPHEN 500 MG/5ML
1000 LIQUID (ML) ORAL EVERY 6 HOURS
Refills: 0 | Status: DISCONTINUED | OUTPATIENT
Start: 2025-04-01 | End: 2025-04-05

## 2025-04-01 RX ORDER — POLYETHYLENE GLYCOL 3350 17 G/17G
17 POWDER, FOR SOLUTION ORAL AT BEDTIME
Refills: 0 | Status: DISCONTINUED | OUTPATIENT
Start: 2025-04-01 | End: 2025-04-01

## 2025-04-01 RX ORDER — DEXTROSE 50 % IN WATER 50 %
12.5 SYRINGE (ML) INTRAVENOUS ONCE
Refills: 0 | Status: DISCONTINUED | OUTPATIENT
Start: 2025-04-01 | End: 2025-04-03

## 2025-04-01 RX ORDER — HALOPERIDOL 10 MG/1
1 TABLET ORAL EVERY 6 HOURS
Refills: 0 | Status: DISCONTINUED | OUTPATIENT
Start: 2025-04-01 | End: 2025-04-01

## 2025-04-01 RX ORDER — ROSUVASTATIN CALCIUM 20 MG/1
10 TABLET, FILM COATED ORAL AT BEDTIME
Refills: 0 | Status: DISCONTINUED | OUTPATIENT
Start: 2025-04-01 | End: 2025-04-01

## 2025-04-01 RX ADMIN — CEFEPIME 100 MILLIGRAM(S): 2 INJECTION, POWDER, FOR SOLUTION INTRAVENOUS at 07:07

## 2025-04-01 RX ADMIN — POLYETHYLENE GLYCOL 3350 17 GRAM(S): 17 POWDER, FOR SOLUTION ORAL at 22:34

## 2025-04-01 RX ADMIN — Medication 250 MILLIGRAM(S): at 13:32

## 2025-04-01 RX ADMIN — IPRATROPIUM BROMIDE AND ALBUTEROL SULFATE 3 MILLILITER(S): .5; 2.5 SOLUTION RESPIRATORY (INHALATION) at 17:49

## 2025-04-01 RX ADMIN — LEVETIRACETAM 500 MILLIGRAM(S): 10 INJECTION, SOLUTION INTRAVENOUS at 07:07

## 2025-04-01 RX ADMIN — ESCITALOPRAM OXALATE 10 MILLIGRAM(S): 20 TABLET ORAL at 03:08

## 2025-04-01 RX ADMIN — Medication 1 APPLICATION(S): at 07:19

## 2025-04-01 RX ADMIN — IPRATROPIUM BROMIDE AND ALBUTEROL SULFATE 3 MILLILITER(S): .5; 2.5 SOLUTION RESPIRATORY (INHALATION) at 09:29

## 2025-04-01 RX ADMIN — Medication 100 MILLIGRAM(S): at 07:44

## 2025-04-01 RX ADMIN — ROSUVASTATIN CALCIUM 10 MILLIGRAM(S): 20 TABLET, FILM COATED ORAL at 22:35

## 2025-04-01 RX ADMIN — FUROSEMIDE 40 MILLIGRAM(S): 10 INJECTION INTRAMUSCULAR; INTRAVENOUS at 16:42

## 2025-04-01 RX ADMIN — INSULIN LISPRO 4: 100 INJECTION, SOLUTION INTRAVENOUS; SUBCUTANEOUS at 11:52

## 2025-04-01 RX ADMIN — Medication 1 APPLICATION(S): at 10:43

## 2025-04-01 RX ADMIN — Medication 255 MILLIGRAM(S): at 00:16

## 2025-04-01 RX ADMIN — LEVETIRACETAM 500 MILLIGRAM(S): 10 INJECTION, SOLUTION INTRAVENOUS at 17:48

## 2025-04-01 RX ADMIN — Medication 1000 MILLIGRAM(S): at 12:10

## 2025-04-01 RX ADMIN — HALOPERIDOL 2 MILLIGRAM(S): 10 TABLET ORAL at 00:53

## 2025-04-01 RX ADMIN — Medication 400 MILLIGRAM(S): at 18:51

## 2025-04-01 RX ADMIN — Medication 25 GRAM(S): at 23:45

## 2025-04-01 RX ADMIN — Medication 400 MILLIGRAM(S): at 07:18

## 2025-04-01 RX ADMIN — ESCITALOPRAM OXALATE 10 MILLIGRAM(S): 20 TABLET ORAL at 22:35

## 2025-04-01 RX ADMIN — INSULIN LISPRO 2: 100 INJECTION, SOLUTION INTRAVENOUS; SUBCUTANEOUS at 17:51

## 2025-04-01 RX ADMIN — Medication 1 APPLICATION(S): at 23:50

## 2025-04-01 RX ADMIN — Medication 40 MILLIGRAM(S): at 07:07

## 2025-04-01 RX ADMIN — HEPARIN SODIUM 7 UNIT(S)/HR: 1000 INJECTION INTRAVENOUS; SUBCUTANEOUS at 16:43

## 2025-04-01 RX ADMIN — IPRATROPIUM BROMIDE AND ALBUTEROL SULFATE 3 MILLILITER(S): .5; 2.5 SOLUTION RESPIRATORY (INHALATION) at 22:34

## 2025-04-01 RX ADMIN — Medication 200 GRAM(S): at 19:54

## 2025-04-01 RX ADMIN — Medication 1000 MILLIGRAM(S): at 11:19

## 2025-04-01 RX ADMIN — Medication 100 MILLIGRAM(S): at 14:36

## 2025-04-02 LAB
ALBUMIN SERPL ELPH-MCNC: 2.4 G/DL — LOW (ref 3.3–5)
ALP SERPL-CCNC: 285 U/L — HIGH (ref 40–120)
ALT FLD-CCNC: 49 U/L — HIGH (ref 10–45)
ANION GAP SERPL CALC-SCNC: 10 MMOL/L — SIGNIFICANT CHANGE UP (ref 5–17)
APTT BLD: 49.7 SEC — HIGH (ref 24.5–35.6)
APTT BLD: 77.3 SEC — HIGH (ref 24.5–35.6)
APTT BLD: 92.3 SEC — HIGH (ref 24.5–35.6)
AST SERPL-CCNC: 22 U/L — SIGNIFICANT CHANGE UP (ref 10–40)
BASOPHILS # BLD AUTO: 0.03 K/UL — SIGNIFICANT CHANGE UP (ref 0–0.2)
BASOPHILS NFR BLD AUTO: 0.4 % — SIGNIFICANT CHANGE UP (ref 0–2)
BILIRUB SERPL-MCNC: 0.3 MG/DL — SIGNIFICANT CHANGE UP (ref 0.2–1.2)
BUN SERPL-MCNC: 18 MG/DL — SIGNIFICANT CHANGE UP (ref 7–23)
CALCIUM SERPL-MCNC: 8.3 MG/DL — LOW (ref 8.4–10.5)
CHLORIDE SERPL-SCNC: 102 MMOL/L — SIGNIFICANT CHANGE UP (ref 96–108)
CO2 SERPL-SCNC: 28 MMOL/L — SIGNIFICANT CHANGE UP (ref 22–31)
CREAT SERPL-MCNC: 0.32 MG/DL — LOW (ref 0.5–1.3)
CREAT SERPL-MCNC: 0.33 MG/DL — LOW (ref 0.5–1.3)
CYSTATIN C SERPL-MCNC: 1.29 MG/L — SIGNIFICANT CHANGE UP (ref 0.68–1.36)
EGFR: 107 ML/MIN/1.73M2 — SIGNIFICANT CHANGE UP
EGFR: 107 ML/MIN/1.73M2 — SIGNIFICANT CHANGE UP
EGFR: 108 ML/MIN/1.73M2 — SIGNIFICANT CHANGE UP
EGFR: 108 ML/MIN/1.73M2 — SIGNIFICANT CHANGE UP
EGFRCR-CYS SERPLBLD CKD-EPI 2021: 79 ML/MIN/1.73M2 — SIGNIFICANT CHANGE UP
EOSINOPHIL # BLD AUTO: 0.23 K/UL — SIGNIFICANT CHANGE UP (ref 0–0.5)
EOSINOPHIL NFR BLD AUTO: 2.7 % — SIGNIFICANT CHANGE UP (ref 0–6)
GFR/BSA.PRED SERPLBLD CYS-BASED-ARV: 48 ML/MIN/1.73M2 — LOW
GLUCOSE SERPL-MCNC: 258 MG/DL — HIGH (ref 70–99)
HCT VFR BLD CALC: 32.4 % — LOW (ref 34.5–45)
HGB BLD-MCNC: 10.4 G/DL — LOW (ref 11.5–15.5)
IMM GRANULOCYTES NFR BLD AUTO: 0.8 % — SIGNIFICANT CHANGE UP (ref 0–0.9)
LDH SERPL L TO P-CCNC: 289 U/L — HIGH (ref 50–242)
LYMPHOCYTES # BLD AUTO: 0.55 K/UL — LOW (ref 1–3.3)
LYMPHOCYTES # BLD AUTO: 6.5 % — LOW (ref 13–44)
MAGNESIUM SERPL-MCNC: 1.8 MG/DL — SIGNIFICANT CHANGE UP (ref 1.6–2.6)
MCHC RBC-ENTMCNC: 29.1 PG — SIGNIFICANT CHANGE UP (ref 27–34)
MCHC RBC-ENTMCNC: 32.1 G/DL — SIGNIFICANT CHANGE UP (ref 32–36)
MCV RBC AUTO: 90.8 FL — SIGNIFICANT CHANGE UP (ref 80–100)
MONOCYTES # BLD AUTO: 0.87 K/UL — SIGNIFICANT CHANGE UP (ref 0–0.9)
MONOCYTES NFR BLD AUTO: 10.3 % — SIGNIFICANT CHANGE UP (ref 2–14)
NEUTROPHILS # BLD AUTO: 6.71 K/UL — SIGNIFICANT CHANGE UP (ref 1.8–7.4)
NEUTROPHILS NFR BLD AUTO: 79.3 % — HIGH (ref 43–77)
NRBC BLD AUTO-RTO: 0 /100 WBCS — SIGNIFICANT CHANGE UP (ref 0–0)
PHOSPHATE SERPL-MCNC: 2.8 MG/DL — SIGNIFICANT CHANGE UP (ref 2.5–4.5)
PLATELET # BLD AUTO: 295 K/UL — SIGNIFICANT CHANGE UP (ref 150–400)
POTASSIUM SERPL-MCNC: 3.6 MMOL/L — SIGNIFICANT CHANGE UP (ref 3.5–5.3)
POTASSIUM SERPL-SCNC: 3.6 MMOL/L — SIGNIFICANT CHANGE UP (ref 3.5–5.3)
PROT SERPL-MCNC: 4.5 G/DL — LOW (ref 6–8.3)
RBC # BLD: 3.57 M/UL — LOW (ref 3.8–5.2)
RBC # FLD: 19.4 % — HIGH (ref 10.3–14.5)
SODIUM SERPL-SCNC: 140 MMOL/L — SIGNIFICANT CHANGE UP (ref 135–145)
WBC # BLD: 8.46 K/UL — SIGNIFICANT CHANGE UP (ref 3.8–10.5)
WBC # FLD AUTO: 8.46 K/UL — SIGNIFICANT CHANGE UP (ref 3.8–10.5)

## 2025-04-02 PROCEDURE — 99232 SBSQ HOSP IP/OBS MODERATE 35: CPT

## 2025-04-02 PROCEDURE — 93970 EXTREMITY STUDY: CPT | Mod: 26

## 2025-04-02 PROCEDURE — G0545: CPT

## 2025-04-02 PROCEDURE — ZZZZZ: CPT

## 2025-04-02 PROCEDURE — 71250 CT THORAX DX C-: CPT | Mod: 26

## 2025-04-02 PROCEDURE — 99233 SBSQ HOSP IP/OBS HIGH 50: CPT | Mod: GC

## 2025-04-02 PROCEDURE — 95720 EEG PHY/QHP EA INCR W/VEEG: CPT

## 2025-04-02 RX ORDER — FUROSEMIDE 10 MG/ML
40 INJECTION INTRAMUSCULAR; INTRAVENOUS ONCE
Refills: 0 | Status: COMPLETED | OUTPATIENT
Start: 2025-04-02 | End: 2025-04-02

## 2025-04-02 RX ORDER — MAGNESIUM SULFATE 500 MG/ML
2 SYRINGE (ML) INJECTION ONCE
Refills: 0 | Status: COMPLETED | OUTPATIENT
Start: 2025-04-02 | End: 2025-04-02

## 2025-04-02 RX ADMIN — IPRATROPIUM BROMIDE AND ALBUTEROL SULFATE 3 MILLILITER(S): .5; 2.5 SOLUTION RESPIRATORY (INHALATION) at 22:53

## 2025-04-02 RX ADMIN — Medication 25 GRAM(S): at 03:20

## 2025-04-02 RX ADMIN — INSULIN LISPRO 4: 100 INJECTION, SOLUTION INTRAVENOUS; SUBCUTANEOUS at 13:38

## 2025-04-02 RX ADMIN — Medication 400 MILLIGRAM(S): at 06:13

## 2025-04-02 RX ADMIN — FUROSEMIDE 40 MILLIGRAM(S): 10 INJECTION INTRAMUSCULAR; INTRAVENOUS at 13:27

## 2025-04-02 RX ADMIN — Medication 1 APPLICATION(S): at 06:15

## 2025-04-02 RX ADMIN — IPRATROPIUM BROMIDE AND ALBUTEROL SULFATE 3 MILLILITER(S): .5; 2.5 SOLUTION RESPIRATORY (INHALATION) at 16:16

## 2025-04-02 RX ADMIN — Medication 250 MILLIGRAM(S): at 14:57

## 2025-04-02 RX ADMIN — HEPARIN SODIUM 9 UNIT(S)/HR: 1000 INJECTION INTRAVENOUS; SUBCUTANEOUS at 22:54

## 2025-04-02 RX ADMIN — Medication 25 GRAM(S): at 22:47

## 2025-04-02 RX ADMIN — IPRATROPIUM BROMIDE AND ALBUTEROL SULFATE 3 MILLILITER(S): .5; 2.5 SOLUTION RESPIRATORY (INHALATION) at 05:13

## 2025-04-02 RX ADMIN — Medication 1 APPLICATION(S): at 17:31

## 2025-04-02 RX ADMIN — Medication 40 MILLIGRAM(S): at 06:52

## 2025-04-02 RX ADMIN — ROSUVASTATIN CALCIUM 10 MILLIGRAM(S): 20 TABLET, FILM COATED ORAL at 22:48

## 2025-04-02 RX ADMIN — ESCITALOPRAM OXALATE 10 MILLIGRAM(S): 20 TABLET ORAL at 22:47

## 2025-04-02 RX ADMIN — INSULIN LISPRO 4: 100 INJECTION, SOLUTION INTRAVENOUS; SUBCUTANEOUS at 06:51

## 2025-04-02 RX ADMIN — LEVETIRACETAM 500 MILLIGRAM(S): 10 INJECTION, SOLUTION INTRAVENOUS at 17:31

## 2025-04-02 RX ADMIN — INSULIN LISPRO 4: 100 INJECTION, SOLUTION INTRAVENOUS; SUBCUTANEOUS at 16:08

## 2025-04-02 RX ADMIN — Medication 40 MILLIEQUIVALENT(S): at 14:43

## 2025-04-02 RX ADMIN — POLYETHYLENE GLYCOL 3350 17 GRAM(S): 17 POWDER, FOR SOLUTION ORAL at 22:48

## 2025-04-02 RX ADMIN — Medication 25 GRAM(S): at 16:13

## 2025-04-02 RX ADMIN — IPRATROPIUM BROMIDE AND ALBUTEROL SULFATE 3 MILLILITER(S): .5; 2.5 SOLUTION RESPIRATORY (INHALATION) at 09:14

## 2025-04-02 RX ADMIN — Medication 400 MILLIGRAM(S): at 17:31

## 2025-04-02 RX ADMIN — INSULIN LISPRO 2: 100 INJECTION, SOLUTION INTRAVENOUS; SUBCUTANEOUS at 23:00

## 2025-04-02 RX ADMIN — Medication 25 GRAM(S): at 08:00

## 2025-04-02 RX ADMIN — HEPARIN SODIUM 9 UNIT(S)/HR: 1000 INJECTION INTRAVENOUS; SUBCUTANEOUS at 09:13

## 2025-04-02 RX ADMIN — Medication 1 APPLICATION(S): at 06:16

## 2025-04-02 RX ADMIN — LEVETIRACETAM 500 MILLIGRAM(S): 10 INJECTION, SOLUTION INTRAVENOUS at 06:13

## 2025-04-03 DIAGNOSIS — C83.30 DIFFUSE LARGE B-CELL LYMPHOMA, UNSPECIFIED SITE: ICD-10-CM

## 2025-04-03 DIAGNOSIS — G40.909 EPILEPSY, UNSPECIFIED, NOT INTRACTABLE, WITHOUT STATUS EPILEPTICUS: ICD-10-CM

## 2025-04-03 DIAGNOSIS — J90 PLEURAL EFFUSION, NOT ELSEWHERE CLASSIFIED: ICD-10-CM

## 2025-04-03 DIAGNOSIS — L89.159 PRESSURE ULCER OF SACRAL REGION, UNSPECIFIED STAGE: ICD-10-CM

## 2025-04-03 DIAGNOSIS — R78.81 BACTEREMIA: ICD-10-CM

## 2025-04-03 DIAGNOSIS — G92.00: ICD-10-CM

## 2025-04-03 DIAGNOSIS — A41.9 SEPSIS, UNSPECIFIED ORGANISM: ICD-10-CM

## 2025-04-03 DIAGNOSIS — R62.7 ADULT FAILURE TO THRIVE: ICD-10-CM

## 2025-04-03 DIAGNOSIS — D64.9 ANEMIA, UNSPECIFIED: ICD-10-CM

## 2025-04-03 DIAGNOSIS — Z29.9 ENCOUNTER FOR PROPHYLACTIC MEASURES, UNSPECIFIED: ICD-10-CM

## 2025-04-03 DIAGNOSIS — I50.32 CHRONIC DIASTOLIC (CONGESTIVE) HEART FAILURE: ICD-10-CM

## 2025-04-03 DIAGNOSIS — F32.9 MAJOR DEPRESSIVE DISORDER, SINGLE EPISODE, UNSPECIFIED: ICD-10-CM

## 2025-04-03 DIAGNOSIS — E11.9 TYPE 2 DIABETES MELLITUS WITHOUT COMPLICATIONS: ICD-10-CM

## 2025-04-03 DIAGNOSIS — I26.99 OTHER PULMONARY EMBOLISM WITHOUT ACUTE COR PULMONALE: ICD-10-CM

## 2025-04-03 DIAGNOSIS — J18.9 PNEUMONIA, UNSPECIFIED ORGANISM: ICD-10-CM

## 2025-04-03 LAB
ANION GAP SERPL CALC-SCNC: 12 MMOL/L — SIGNIFICANT CHANGE UP (ref 5–17)
APTT BLD: 69.5 SEC — HIGH (ref 24.5–35.6)
BASOPHILS # BLD AUTO: 0.04 K/UL — SIGNIFICANT CHANGE UP (ref 0–0.2)
BASOPHILS NFR BLD AUTO: 0.5 % — SIGNIFICANT CHANGE UP (ref 0–2)
BUN SERPL-MCNC: 13 MG/DL — SIGNIFICANT CHANGE UP (ref 7–23)
CALCIUM SERPL-MCNC: 8 MG/DL — LOW (ref 8.4–10.5)
CHLORIDE SERPL-SCNC: 95 MMOL/L — LOW (ref 96–108)
CO2 SERPL-SCNC: 31 MMOL/L — SIGNIFICANT CHANGE UP (ref 22–31)
CREAT SERPL-MCNC: 0.37 MG/DL — LOW (ref 0.5–1.3)
CREAT SERPL-MCNC: 0.37 MG/DL — LOW (ref 0.5–1.3)
CYSTATIN C SERPL-MCNC: 1.34 MG/L — SIGNIFICANT CHANGE UP (ref 0.68–1.36)
EGFR: 104 ML/MIN/1.73M2 — SIGNIFICANT CHANGE UP
EGFRCR-CYS SERPLBLD CKD-EPI 2021: 74 ML/MIN/1.73M2 — SIGNIFICANT CHANGE UP
EOSINOPHIL # BLD AUTO: 0.16 K/UL — SIGNIFICANT CHANGE UP (ref 0–0.5)
EOSINOPHIL NFR BLD AUTO: 1.9 % — SIGNIFICANT CHANGE UP (ref 0–6)
FUNGITELL: 110 PG/ML — HIGH
FUNGITELL: 416 PG/ML — HIGH
GFR/BSA.PRED SERPLBLD CYS-BASED-ARV: 46 ML/MIN/1.73M2 — LOW
GLUCOSE SERPL-MCNC: 299 MG/DL — HIGH (ref 70–99)
HCT VFR BLD CALC: 30.1 % — LOW (ref 34.5–45)
HGB BLD-MCNC: 9.9 G/DL — LOW (ref 11.5–15.5)
IMM GRANULOCYTES NFR BLD AUTO: 0.7 % — SIGNIFICANT CHANGE UP (ref 0–0.9)
LYMPHOCYTES # BLD AUTO: 0.64 K/UL — LOW (ref 1–3.3)
LYMPHOCYTES # BLD AUTO: 7.6 % — LOW (ref 13–44)
MAGNESIUM SERPL-MCNC: 2.4 MG/DL — SIGNIFICANT CHANGE UP (ref 1.6–2.6)
MCHC RBC-ENTMCNC: 29.1 PG — SIGNIFICANT CHANGE UP (ref 27–34)
MCHC RBC-ENTMCNC: 32.9 G/DL — SIGNIFICANT CHANGE UP (ref 32–36)
MCV RBC AUTO: 88.5 FL — SIGNIFICANT CHANGE UP (ref 80–100)
MONOCYTES # BLD AUTO: 0.97 K/UL — HIGH (ref 0–0.9)
MONOCYTES NFR BLD AUTO: 11.5 % — SIGNIFICANT CHANGE UP (ref 2–14)
NEUTROPHILS # BLD AUTO: 6.55 K/UL — SIGNIFICANT CHANGE UP (ref 1.8–7.4)
NEUTROPHILS NFR BLD AUTO: 77.8 % — HIGH (ref 43–77)
NRBC BLD AUTO-RTO: 0 /100 WBCS — SIGNIFICANT CHANGE UP (ref 0–0)
PHOSPHATE SERPL-MCNC: 2.3 MG/DL — LOW (ref 2.5–4.5)
PLATELET # BLD AUTO: 334 K/UL — SIGNIFICANT CHANGE UP (ref 150–400)
POTASSIUM SERPL-MCNC: 3.5 MMOL/L — SIGNIFICANT CHANGE UP (ref 3.5–5.3)
POTASSIUM SERPL-SCNC: 3.5 MMOL/L — SIGNIFICANT CHANGE UP (ref 3.5–5.3)
RBC # BLD: 3.4 M/UL — LOW (ref 3.8–5.2)
RBC # FLD: 19.9 % — HIGH (ref 10.3–14.5)
SODIUM SERPL-SCNC: 138 MMOL/L — SIGNIFICANT CHANGE UP (ref 135–145)
VANCOMYCIN FLD-MCNC: 27 UG/ML
WBC # BLD: 8.42 K/UL — SIGNIFICANT CHANGE UP (ref 3.8–10.5)
WBC # FLD AUTO: 8.42 K/UL — SIGNIFICANT CHANGE UP (ref 3.8–10.5)

## 2025-04-03 PROCEDURE — G0545: CPT

## 2025-04-03 PROCEDURE — 99223 1ST HOSP IP/OBS HIGH 75: CPT | Mod: GC,AI

## 2025-04-03 PROCEDURE — 99232 SBSQ HOSP IP/OBS MODERATE 35: CPT

## 2025-04-03 PROCEDURE — 93010 ELECTROCARDIOGRAM REPORT: CPT

## 2025-04-03 PROCEDURE — 99233 SBSQ HOSP IP/OBS HIGH 50: CPT | Mod: GC,25

## 2025-04-03 PROCEDURE — 76604 US EXAM CHEST: CPT | Mod: 26

## 2025-04-03 PROCEDURE — G0316 PROLONG INPT EVAL ADD15 M: CPT

## 2025-04-03 PROCEDURE — 99233 SBSQ HOSP IP/OBS HIGH 50: CPT | Mod: GC

## 2025-04-03 RX ORDER — SOD PHOS DI, MONO/K PHOS MONO 250 MG
1 TABLET ORAL
Refills: 0 | Status: COMPLETED | OUTPATIENT
Start: 2025-04-03 | End: 2025-04-03

## 2025-04-03 RX ORDER — DRONABINOL 10 MG/1
1 CAPSULE ORAL
Refills: 0 | DISCHARGE

## 2025-04-03 RX ORDER — METOPROLOL SUCCINATE 50 MG/1
25 TABLET, EXTENDED RELEASE ORAL EVERY 24 HOURS
Refills: 0 | Status: DISCONTINUED | OUTPATIENT
Start: 2025-04-03 | End: 2025-04-04

## 2025-04-03 RX ORDER — METOPROLOL SUCCINATE 50 MG/1
1 TABLET, EXTENDED RELEASE ORAL
Refills: 0 | DISCHARGE

## 2025-04-03 RX ORDER — OMEPRAZOLE 20 MG/1
1 CAPSULE, DELAYED RELEASE ORAL
Refills: 0 | DISCHARGE

## 2025-04-03 RX ORDER — ESCITALOPRAM OXALATE 20 MG/1
1 TABLET ORAL
Refills: 0 | DISCHARGE

## 2025-04-03 RX ORDER — ICOSAPENT ETHYL 500 MG/1
2 CAPSULE ORAL
Refills: 0 | DISCHARGE

## 2025-04-03 RX ORDER — FUROSEMIDE 10 MG/ML
40 INJECTION INTRAMUSCULAR; INTRAVENOUS ONCE
Refills: 0 | Status: COMPLETED | OUTPATIENT
Start: 2025-04-03 | End: 2025-04-03

## 2025-04-03 RX ORDER — ASPIRIN 325 MG
81 TABLET ORAL EVERY 24 HOURS
Refills: 0 | Status: DISCONTINUED | OUTPATIENT
Start: 2025-04-03 | End: 2025-04-03

## 2025-04-03 RX ORDER — APIXABAN 2.5 MG/1
1 TABLET, FILM COATED ORAL
Refills: 0 | DISCHARGE

## 2025-04-03 RX ORDER — ROSUVASTATIN CALCIUM 20 MG/1
1 TABLET, FILM COATED ORAL
Refills: 0 | DISCHARGE

## 2025-04-03 RX ADMIN — Medication 250 MILLIGRAM(S): at 11:16

## 2025-04-03 RX ADMIN — INSULIN LISPRO 8: 100 INJECTION, SOLUTION INTRAVENOUS; SUBCUTANEOUS at 11:29

## 2025-04-03 RX ADMIN — POLYETHYLENE GLYCOL 3350 17 GRAM(S): 17 POWDER, FOR SOLUTION ORAL at 23:12

## 2025-04-03 RX ADMIN — LEVETIRACETAM 500 MILLIGRAM(S): 10 INJECTION, SOLUTION INTRAVENOUS at 06:14

## 2025-04-03 RX ADMIN — Medication 25 GRAM(S): at 23:12

## 2025-04-03 RX ADMIN — Medication 25 GRAM(S): at 13:55

## 2025-04-03 RX ADMIN — IPRATROPIUM BROMIDE AND ALBUTEROL SULFATE 3 MILLILITER(S): .5; 2.5 SOLUTION RESPIRATORY (INHALATION) at 09:35

## 2025-04-03 RX ADMIN — Medication 25 GRAM(S): at 06:26

## 2025-04-03 RX ADMIN — Medication 40 MILLIGRAM(S): at 06:35

## 2025-04-03 RX ADMIN — INSULIN LISPRO 6: 100 INJECTION, SOLUTION INTRAVENOUS; SUBCUTANEOUS at 06:40

## 2025-04-03 RX ADMIN — Medication 1 PACKET(S): at 11:24

## 2025-04-03 RX ADMIN — IPRATROPIUM BROMIDE AND ALBUTEROL SULFATE 3 MILLILITER(S): .5; 2.5 SOLUTION RESPIRATORY (INHALATION) at 04:07

## 2025-04-03 RX ADMIN — INSULIN LISPRO 6: 100 INJECTION, SOLUTION INTRAVENOUS; SUBCUTANEOUS at 19:40

## 2025-04-03 RX ADMIN — Medication 1 APPLICATION(S): at 06:16

## 2025-04-03 RX ADMIN — Medication 1 APPLICATION(S): at 19:51

## 2025-04-03 RX ADMIN — FUROSEMIDE 40 MILLIGRAM(S): 10 INJECTION INTRAMUSCULAR; INTRAVENOUS at 09:37

## 2025-04-03 RX ADMIN — Medication 400 MILLIGRAM(S): at 19:34

## 2025-04-03 RX ADMIN — IPRATROPIUM BROMIDE AND ALBUTEROL SULFATE 3 MILLILITER(S): .5; 2.5 SOLUTION RESPIRATORY (INHALATION) at 23:12

## 2025-04-03 RX ADMIN — METOPROLOL SUCCINATE 25 MILLIGRAM(S): 50 TABLET, EXTENDED RELEASE ORAL at 19:34

## 2025-04-03 RX ADMIN — Medication 400 MILLIGRAM(S): at 06:15

## 2025-04-03 RX ADMIN — ESCITALOPRAM OXALATE 10 MILLIGRAM(S): 20 TABLET ORAL at 23:11

## 2025-04-03 RX ADMIN — ROSUVASTATIN CALCIUM 10 MILLIGRAM(S): 20 TABLET, FILM COATED ORAL at 23:11

## 2025-04-03 RX ADMIN — Medication 1 PACKET(S): at 12:56

## 2025-04-03 RX ADMIN — INSULIN LISPRO 4: 100 INJECTION, SOLUTION INTRAVENOUS; SUBCUTANEOUS at 22:55

## 2025-04-03 RX ADMIN — LEVETIRACETAM 500 MILLIGRAM(S): 10 INJECTION, SOLUTION INTRAVENOUS at 19:34

## 2025-04-03 RX ADMIN — Medication 1 APPLICATION(S): at 06:15

## 2025-04-04 LAB
ANION GAP SERPL CALC-SCNC: 8 MMOL/L — SIGNIFICANT CHANGE UP (ref 5–17)
APTT BLD: 76.6 SEC — HIGH (ref 24.5–35.6)
BASOPHILS # BLD AUTO: 0.04 K/UL — SIGNIFICANT CHANGE UP (ref 0–0.2)
BASOPHILS NFR BLD AUTO: 0.6 % — SIGNIFICANT CHANGE UP (ref 0–2)
BUN SERPL-MCNC: 10 MG/DL — SIGNIFICANT CHANGE UP (ref 7–23)
CALCIUM SERPL-MCNC: 8 MG/DL — LOW (ref 8.4–10.5)
CHLORIDE SERPL-SCNC: 93 MMOL/L — LOW (ref 96–108)
CO2 SERPL-SCNC: 33 MMOL/L — HIGH (ref 22–31)
CREAT SERPL-MCNC: 0.38 MG/DL — LOW (ref 0.5–1.3)
EGFR: 104 ML/MIN/1.73M2 — SIGNIFICANT CHANGE UP
EGFR: 104 ML/MIN/1.73M2 — SIGNIFICANT CHANGE UP
EOSINOPHIL # BLD AUTO: 0.32 K/UL — SIGNIFICANT CHANGE UP (ref 0–0.5)
EOSINOPHIL NFR BLD AUTO: 5 % — SIGNIFICANT CHANGE UP (ref 0–6)
GLUCOSE SERPL-MCNC: 239 MG/DL — HIGH (ref 70–99)
HCT VFR BLD CALC: 29.2 % — LOW (ref 34.5–45)
HGB BLD-MCNC: 9.4 G/DL — LOW (ref 11.5–15.5)
IMM GRANULOCYTES NFR BLD AUTO: 0.9 % — SIGNIFICANT CHANGE UP (ref 0–0.9)
LYMPHOCYTES # BLD AUTO: 0.72 K/UL — LOW (ref 1–3.3)
LYMPHOCYTES # BLD AUTO: 11.3 % — LOW (ref 13–44)
MAGNESIUM SERPL-MCNC: 2 MG/DL — SIGNIFICANT CHANGE UP (ref 1.6–2.6)
MCHC RBC-ENTMCNC: 28.9 PG — SIGNIFICANT CHANGE UP (ref 27–34)
MCHC RBC-ENTMCNC: 32.2 G/DL — SIGNIFICANT CHANGE UP (ref 32–36)
MCV RBC AUTO: 89.8 FL — SIGNIFICANT CHANGE UP (ref 80–100)
MONOCYTES # BLD AUTO: 0.82 K/UL — SIGNIFICANT CHANGE UP (ref 0–0.9)
MONOCYTES NFR BLD AUTO: 12.8 % — SIGNIFICANT CHANGE UP (ref 2–14)
NEUTROPHILS # BLD AUTO: 4.43 K/UL — SIGNIFICANT CHANGE UP (ref 1.8–7.4)
NEUTROPHILS NFR BLD AUTO: 69.4 % — SIGNIFICANT CHANGE UP (ref 43–77)
NRBC BLD AUTO-RTO: 0 /100 WBCS — SIGNIFICANT CHANGE UP (ref 0–0)
PHOSPHATE SERPL-MCNC: 2.5 MG/DL — SIGNIFICANT CHANGE UP (ref 2.5–4.5)
PLATELET # BLD AUTO: 325 K/UL — SIGNIFICANT CHANGE UP (ref 150–400)
POTASSIUM SERPL-MCNC: 3.2 MMOL/L — LOW (ref 3.5–5.3)
POTASSIUM SERPL-SCNC: 3.2 MMOL/L — LOW (ref 3.5–5.3)
RBC # BLD: 3.25 M/UL — LOW (ref 3.8–5.2)
RBC # FLD: 20 % — HIGH (ref 10.3–14.5)
SODIUM SERPL-SCNC: 134 MMOL/L — LOW (ref 135–145)
VANCOMYCIN TROUGH SERPL-MCNC: 13 UG/ML — SIGNIFICANT CHANGE UP (ref 10–20)
WBC # BLD: 6.39 K/UL — SIGNIFICANT CHANGE UP (ref 3.8–10.5)
WBC # FLD AUTO: 6.39 K/UL — SIGNIFICANT CHANGE UP (ref 3.8–10.5)

## 2025-04-04 PROCEDURE — G0545: CPT

## 2025-04-04 PROCEDURE — 99232 SBSQ HOSP IP/OBS MODERATE 35: CPT

## 2025-04-04 PROCEDURE — 71260 CT THORAX DX C+: CPT | Mod: 26

## 2025-04-04 PROCEDURE — 99233 SBSQ HOSP IP/OBS HIGH 50: CPT

## 2025-04-04 PROCEDURE — 99233 SBSQ HOSP IP/OBS HIGH 50: CPT | Mod: GC

## 2025-04-04 RX ORDER — PIPERACILLIN-TAZO-DEXTROSE,ISO 3.375G/5
4.5 IV SOLUTION, PIGGYBACK PREMIX FROZEN(ML) INTRAVENOUS
Qty: 0 | Refills: 0 | DISCHARGE
Start: 2025-04-04

## 2025-04-04 RX ORDER — INSULIN ASPART 100 [IU]/ML
4 INJECTION, SOLUTION INTRAVENOUS; SUBCUTANEOUS
Refills: 0 | DISCHARGE

## 2025-04-04 RX ORDER — APIXABAN 2.5 MG/1
5 TABLET, FILM COATED ORAL EVERY 12 HOURS
Refills: 0 | Status: DISCONTINUED | OUTPATIENT
Start: 2025-04-04 | End: 2025-04-05

## 2025-04-04 RX ORDER — FUROSEMIDE 10 MG/ML
40 INJECTION INTRAMUSCULAR; INTRAVENOUS ONCE
Refills: 0 | Status: COMPLETED | OUTPATIENT
Start: 2025-04-04 | End: 2025-04-04

## 2025-04-04 RX ORDER — VANCOMYCIN HCL IN 5 % DEXTROSE 1.5G/250ML
1000 PLASTIC BAG, INJECTION (ML) INTRAVENOUS EVERY 24 HOURS
Refills: 0 | Status: DISCONTINUED | OUTPATIENT
Start: 2025-04-04 | End: 2025-04-05

## 2025-04-04 RX ORDER — ASPIRIN 325 MG
1 TABLET ORAL
Refills: 0 | DISCHARGE

## 2025-04-04 RX ADMIN — LEVETIRACETAM 500 MILLIGRAM(S): 10 INJECTION, SOLUTION INTRAVENOUS at 18:26

## 2025-04-04 RX ADMIN — IPRATROPIUM BROMIDE AND ALBUTEROL SULFATE 3 MILLILITER(S): .5; 2.5 SOLUTION RESPIRATORY (INHALATION) at 18:26

## 2025-04-04 RX ADMIN — Medication 40 MILLIEQUIVALENT(S): at 11:20

## 2025-04-04 RX ADMIN — Medication 25 GRAM(S): at 06:04

## 2025-04-04 RX ADMIN — ROSUVASTATIN CALCIUM 10 MILLIGRAM(S): 20 TABLET, FILM COATED ORAL at 23:03

## 2025-04-04 RX ADMIN — Medication 400 MILLIGRAM(S): at 18:26

## 2025-04-04 RX ADMIN — IPRATROPIUM BROMIDE AND ALBUTEROL SULFATE 3 MILLILITER(S): .5; 2.5 SOLUTION RESPIRATORY (INHALATION) at 23:03

## 2025-04-04 RX ADMIN — Medication 40 MILLIGRAM(S): at 06:04

## 2025-04-04 RX ADMIN — INSULIN LISPRO 4: 100 INJECTION, SOLUTION INTRAVENOUS; SUBCUTANEOUS at 14:09

## 2025-04-04 RX ADMIN — INSULIN LISPRO 4: 100 INJECTION, SOLUTION INTRAVENOUS; SUBCUTANEOUS at 23:02

## 2025-04-04 RX ADMIN — Medication 40 MILLIEQUIVALENT(S): at 14:31

## 2025-04-04 RX ADMIN — Medication 1 APPLICATION(S): at 06:11

## 2025-04-04 RX ADMIN — ESCITALOPRAM OXALATE 10 MILLIGRAM(S): 20 TABLET ORAL at 23:03

## 2025-04-04 RX ADMIN — Medication 25 GRAM(S): at 23:03

## 2025-04-04 RX ADMIN — LEVETIRACETAM 500 MILLIGRAM(S): 10 INJECTION, SOLUTION INTRAVENOUS at 06:04

## 2025-04-04 RX ADMIN — Medication 25 GRAM(S): at 14:31

## 2025-04-04 RX ADMIN — APIXABAN 5 MILLIGRAM(S): 2.5 TABLET, FILM COATED ORAL at 14:31

## 2025-04-04 RX ADMIN — Medication 1 APPLICATION(S): at 18:26

## 2025-04-04 RX ADMIN — IPRATROPIUM BROMIDE AND ALBUTEROL SULFATE 3 MILLILITER(S): .5; 2.5 SOLUTION RESPIRATORY (INHALATION) at 09:25

## 2025-04-04 RX ADMIN — Medication 250 MILLIGRAM(S): at 18:26

## 2025-04-04 RX ADMIN — Medication 400 MILLIGRAM(S): at 06:04

## 2025-04-04 RX ADMIN — Medication 1 APPLICATION(S): at 06:12

## 2025-04-04 RX ADMIN — HEPARIN SODIUM 9 UNIT(S)/HR: 1000 INJECTION INTRAVENOUS; SUBCUTANEOUS at 02:54

## 2025-04-04 RX ADMIN — FUROSEMIDE 40 MILLIGRAM(S): 10 INJECTION INTRAMUSCULAR; INTRAVENOUS at 11:20

## 2025-04-04 RX ADMIN — Medication 100 MILLIEQUIVALENT(S): at 11:20

## 2025-04-04 RX ADMIN — INSULIN LISPRO 4: 100 INJECTION, SOLUTION INTRAVENOUS; SUBCUTANEOUS at 10:05

## 2025-04-04 RX ADMIN — IPRATROPIUM BROMIDE AND ALBUTEROL SULFATE 3 MILLILITER(S): .5; 2.5 SOLUTION RESPIRATORY (INHALATION) at 06:14

## 2025-04-05 VITALS
SYSTOLIC BLOOD PRESSURE: 117 MMHG | TEMPERATURE: 97 F | HEART RATE: 86 BPM | RESPIRATION RATE: 18 BRPM | DIASTOLIC BLOOD PRESSURE: 70 MMHG | OXYGEN SATURATION: 99 %

## 2025-04-05 LAB
ANION GAP SERPL CALC-SCNC: 9 MMOL/L — SIGNIFICANT CHANGE UP (ref 5–17)
BASOPHILS # BLD AUTO: 0.06 K/UL — SIGNIFICANT CHANGE UP (ref 0–0.2)
BASOPHILS NFR BLD AUTO: 0.9 % — SIGNIFICANT CHANGE UP (ref 0–2)
BUN SERPL-MCNC: 13 MG/DL — SIGNIFICANT CHANGE UP (ref 7–23)
CALCIUM SERPL-MCNC: 8.4 MG/DL — SIGNIFICANT CHANGE UP (ref 8.4–10.5)
CHLORIDE SERPL-SCNC: 96 MMOL/L — SIGNIFICANT CHANGE UP (ref 96–108)
CO2 SERPL-SCNC: 32 MMOL/L — HIGH (ref 22–31)
CREAT SERPL-MCNC: 0.35 MG/DL — LOW (ref 0.5–1.3)
EGFR: 106 ML/MIN/1.73M2 — SIGNIFICANT CHANGE UP
EGFR: 106 ML/MIN/1.73M2 — SIGNIFICANT CHANGE UP
EOSINOPHIL # BLD AUTO: 0.46 K/UL — SIGNIFICANT CHANGE UP (ref 0–0.5)
EOSINOPHIL NFR BLD AUTO: 7 % — HIGH (ref 0–6)
GLUCOSE SERPL-MCNC: 319 MG/DL — HIGH (ref 70–99)
HCT VFR BLD CALC: 31 % — LOW (ref 34.5–45)
HGB BLD-MCNC: 9.8 G/DL — LOW (ref 11.5–15.5)
LYMPHOCYTES # BLD AUTO: 0.46 K/UL — LOW (ref 1–3.3)
LYMPHOCYTES # BLD AUTO: 7 % — LOW (ref 13–44)
MAGNESIUM SERPL-MCNC: 1.9 MG/DL — SIGNIFICANT CHANGE UP (ref 1.6–2.6)
MCHC RBC-ENTMCNC: 28.7 PG — SIGNIFICANT CHANGE UP (ref 27–34)
MCHC RBC-ENTMCNC: 31.6 G/DL — LOW (ref 32–36)
MCV RBC AUTO: 90.9 FL — SIGNIFICANT CHANGE UP (ref 80–100)
MONOCYTES # BLD AUTO: 0.46 K/UL — SIGNIFICANT CHANGE UP (ref 0–0.9)
MONOCYTES NFR BLD AUTO: 7 % — SIGNIFICANT CHANGE UP (ref 2–14)
NEUTROPHILS # BLD AUTO: 5.17 K/UL — SIGNIFICANT CHANGE UP (ref 1.8–7.4)
NEUTROPHILS NFR BLD AUTO: 78.1 % — HIGH (ref 43–77)
PHOSPHATE SERPL-MCNC: 2.8 MG/DL — SIGNIFICANT CHANGE UP (ref 2.5–4.5)
PLATELET # BLD AUTO: 308 K/UL — SIGNIFICANT CHANGE UP (ref 150–400)
POTASSIUM SERPL-MCNC: 4.1 MMOL/L — SIGNIFICANT CHANGE UP (ref 3.5–5.3)
POTASSIUM SERPL-SCNC: 4.1 MMOL/L — SIGNIFICANT CHANGE UP (ref 3.5–5.3)
RBC # BLD: 3.41 M/UL — LOW (ref 3.8–5.2)
RBC # FLD: 20.2 % — HIGH (ref 10.3–14.5)
SODIUM SERPL-SCNC: 137 MMOL/L — SIGNIFICANT CHANGE UP (ref 135–145)
WBC # BLD: 6.62 K/UL — SIGNIFICANT CHANGE UP (ref 3.8–10.5)
WBC # FLD AUTO: 6.62 K/UL — SIGNIFICANT CHANGE UP (ref 3.8–10.5)

## 2025-04-05 PROCEDURE — 87640 STAPH A DNA AMP PROBE: CPT

## 2025-04-05 PROCEDURE — 82550 ASSAY OF CK (CPK): CPT

## 2025-04-05 PROCEDURE — 96375 TX/PRO/DX INJ NEW DRUG ADDON: CPT

## 2025-04-05 PROCEDURE — 71045 X-RAY EXAM CHEST 1 VIEW: CPT

## 2025-04-05 PROCEDURE — 83615 LACTATE (LD) (LDH) ENZYME: CPT

## 2025-04-05 PROCEDURE — 84295 ASSAY OF SERUM SODIUM: CPT

## 2025-04-05 PROCEDURE — P9040: CPT

## 2025-04-05 PROCEDURE — 82272 OCCULT BLD FECES 1-3 TESTS: CPT

## 2025-04-05 PROCEDURE — 81001 URINALYSIS AUTO W/SCOPE: CPT

## 2025-04-05 PROCEDURE — 82977 ASSAY OF GGT: CPT

## 2025-04-05 PROCEDURE — 82803 BLOOD GASES ANY COMBINATION: CPT

## 2025-04-05 PROCEDURE — 80053 COMPREHEN METABOLIC PANEL: CPT

## 2025-04-05 PROCEDURE — 36430 TRANSFUSION BLD/BLD COMPNT: CPT

## 2025-04-05 PROCEDURE — 80202 ASSAY OF VANCOMYCIN: CPT

## 2025-04-05 PROCEDURE — 82962 GLUCOSE BLOOD TEST: CPT

## 2025-04-05 PROCEDURE — 94640 AIRWAY INHALATION TREATMENT: CPT

## 2025-04-05 PROCEDURE — 84466 ASSAY OF TRANSFERRIN: CPT

## 2025-04-05 PROCEDURE — 83550 IRON BINDING TEST: CPT

## 2025-04-05 PROCEDURE — 80048 BASIC METABOLIC PNL TOTAL CA: CPT

## 2025-04-05 PROCEDURE — 70553 MRI BRAIN STEM W/O & W/DYE: CPT | Mod: MC

## 2025-04-05 PROCEDURE — 83540 ASSAY OF IRON: CPT

## 2025-04-05 PROCEDURE — 85027 COMPLETE CBC AUTOMATED: CPT

## 2025-04-05 PROCEDURE — 87637 SARSCOV2&INF A&B&RSV AMP PRB: CPT

## 2025-04-05 PROCEDURE — 71250 CT THORAX DX C-: CPT | Mod: MC

## 2025-04-05 PROCEDURE — 99291 CRITICAL CARE FIRST HOUR: CPT | Mod: 25

## 2025-04-05 PROCEDURE — 93307 TTE W/O DOPPLER COMPLETE: CPT

## 2025-04-05 PROCEDURE — 82565 ASSAY OF CREATININE: CPT

## 2025-04-05 PROCEDURE — 0225U NFCT DS DNA&RNA 21 SARSCOV2: CPT

## 2025-04-05 PROCEDURE — 86923 COMPATIBILITY TEST ELECTRIC: CPT

## 2025-04-05 PROCEDURE — 87899 AGENT NOS ASSAY W/OPTIC: CPT

## 2025-04-05 PROCEDURE — 96374 THER/PROPH/DIAG INJ IV PUSH: CPT

## 2025-04-05 PROCEDURE — 95708 EEG WO VID EA 12-26HR UNMNTR: CPT

## 2025-04-05 PROCEDURE — 82607 VITAMIN B-12: CPT

## 2025-04-05 PROCEDURE — 99239 HOSP IP/OBS DSCHRG MGMT >30: CPT

## 2025-04-05 PROCEDURE — 82746 ASSAY OF FOLIC ACID SERUM: CPT

## 2025-04-05 PROCEDURE — 85610 PROTHROMBIN TIME: CPT

## 2025-04-05 PROCEDURE — 85025 COMPLETE CBC W/AUTO DIFF WBC: CPT

## 2025-04-05 PROCEDURE — 82553 CREATINE MB FRACTION: CPT

## 2025-04-05 PROCEDURE — 93005 ELECTROCARDIOGRAM TRACING: CPT

## 2025-04-05 PROCEDURE — 87641 MR-STAPH DNA AMP PROBE: CPT

## 2025-04-05 PROCEDURE — 70553 MRI BRAIN STEM W/O & W/DYE: CPT | Mod: 26

## 2025-04-05 PROCEDURE — A9585: CPT

## 2025-04-05 PROCEDURE — 82610 CYSTATIN C: CPT

## 2025-04-05 PROCEDURE — 83735 ASSAY OF MAGNESIUM: CPT

## 2025-04-05 PROCEDURE — 84484 ASSAY OF TROPONIN QUANT: CPT

## 2025-04-05 PROCEDURE — 84132 ASSAY OF SERUM POTASSIUM: CPT

## 2025-04-05 PROCEDURE — 82728 ASSAY OF FERRITIN: CPT

## 2025-04-05 PROCEDURE — 83880 ASSAY OF NATRIURETIC PEPTIDE: CPT

## 2025-04-05 PROCEDURE — 93970 EXTREMITY STUDY: CPT

## 2025-04-05 PROCEDURE — 83036 HEMOGLOBIN GLYCOSYLATED A1C: CPT

## 2025-04-05 PROCEDURE — 82330 ASSAY OF CALCIUM: CPT

## 2025-04-05 PROCEDURE — 71260 CT THORAX DX C+: CPT | Mod: MC

## 2025-04-05 PROCEDURE — 93308 TTE F-UP OR LMTD: CPT

## 2025-04-05 PROCEDURE — 84100 ASSAY OF PHOSPHORUS: CPT

## 2025-04-05 PROCEDURE — 86901 BLOOD TYPING SEROLOGIC RH(D): CPT

## 2025-04-05 PROCEDURE — 86850 RBC ANTIBODY SCREEN: CPT

## 2025-04-05 PROCEDURE — 85730 THROMBOPLASTIN TIME PARTIAL: CPT

## 2025-04-05 PROCEDURE — 36415 COLL VENOUS BLD VENIPUNCTURE: CPT

## 2025-04-05 PROCEDURE — 95700 EEG CONT REC W/VID EEG TECH: CPT

## 2025-04-05 PROCEDURE — 87449 NOS EACH ORGANISM AG IA: CPT

## 2025-04-05 PROCEDURE — 85045 AUTOMATED RETICULOCYTE COUNT: CPT

## 2025-04-05 PROCEDURE — 87040 BLOOD CULTURE FOR BACTERIA: CPT

## 2025-04-05 PROCEDURE — 83605 ASSAY OF LACTIC ACID: CPT

## 2025-04-05 PROCEDURE — 86900 BLOOD TYPING SEROLOGIC ABO: CPT

## 2025-04-05 RX ORDER — DEXTROSE 50 % IN WATER 50 %
25 SYRINGE (ML) INTRAVENOUS ONCE
Refills: 0 | Status: DISCONTINUED | OUTPATIENT
Start: 2025-04-05 | End: 2025-04-05

## 2025-04-05 RX ORDER — SODIUM CHLORIDE 9 G/1000ML
1000 INJECTION, SOLUTION INTRAVENOUS
Refills: 0 | Status: DISCONTINUED | OUTPATIENT
Start: 2025-04-05 | End: 2025-04-05

## 2025-04-05 RX ORDER — FUROSEMIDE 10 MG/ML
1 INJECTION INTRAMUSCULAR; INTRAVENOUS
Qty: 3 | Refills: 0
Start: 2025-04-05 | End: 2025-04-07

## 2025-04-05 RX ORDER — VANCOMYCIN HCL IN 5 % DEXTROSE 1.5G/250ML
1 PLASTIC BAG, INJECTION (ML) INTRAVENOUS
Qty: 0 | Refills: 0 | DISCHARGE
Start: 2025-04-05

## 2025-04-05 RX ORDER — INSULIN LISPRO 100 U/ML
2 INJECTION, SOLUTION INTRAVENOUS; SUBCUTANEOUS EVERY 6 HOURS
Refills: 0 | Status: DISCONTINUED | OUTPATIENT
Start: 2025-04-05 | End: 2025-04-05

## 2025-04-05 RX ORDER — DEXTROSE 50 % IN WATER 50 %
12.5 SYRINGE (ML) INTRAVENOUS ONCE
Refills: 0 | Status: DISCONTINUED | OUTPATIENT
Start: 2025-04-05 | End: 2025-04-05

## 2025-04-05 RX ORDER — INSULIN GLARGINE-YFGN 100 [IU]/ML
5 INJECTION, SOLUTION SUBCUTANEOUS AT BEDTIME
Refills: 0 | Status: DISCONTINUED | OUTPATIENT
Start: 2025-04-05 | End: 2025-04-05

## 2025-04-05 RX ORDER — DEXTROSE 50 % IN WATER 50 %
15 SYRINGE (ML) INTRAVENOUS ONCE
Refills: 0 | Status: DISCONTINUED | OUTPATIENT
Start: 2025-04-05 | End: 2025-04-05

## 2025-04-05 RX ORDER — INSULIN GLARGINE-YFGN 100 [IU]/ML
6 INJECTION, SOLUTION SUBCUTANEOUS
Refills: 0 | DISCHARGE

## 2025-04-05 RX ORDER — LORAZEPAM 4 MG/ML
0.5 VIAL (ML) INJECTION ONCE
Refills: 0 | Status: DISCONTINUED | OUTPATIENT
Start: 2025-04-05 | End: 2025-04-05

## 2025-04-05 RX ORDER — PIPERACILLIN-TAZO-DEXTROSE,ISO 3.375G/5
4.5 IV SOLUTION, PIGGYBACK PREMIX FROZEN(ML) INTRAVENOUS
Qty: 0 | Refills: 0 | DISCHARGE
Start: 2025-04-05 | End: 2025-04-10

## 2025-04-05 RX ORDER — SILVER SULFADIAZINE 1 %
1 CREAM (GRAM) TOPICAL
Qty: 0 | Refills: 0 | DISCHARGE
Start: 2025-04-05

## 2025-04-05 RX ADMIN — INSULIN LISPRO 2 UNIT(S): 100 INJECTION, SOLUTION INTRAVENOUS; SUBCUTANEOUS at 13:58

## 2025-04-05 RX ADMIN — Medication 250 MILLIGRAM(S): at 18:27

## 2025-04-05 RX ADMIN — Medication 1 APPLICATION(S): at 06:17

## 2025-04-05 RX ADMIN — INSULIN LISPRO 4: 100 INJECTION, SOLUTION INTRAVENOUS; SUBCUTANEOUS at 18:39

## 2025-04-05 RX ADMIN — INSULIN LISPRO 8: 100 INJECTION, SOLUTION INTRAVENOUS; SUBCUTANEOUS at 09:47

## 2025-04-05 RX ADMIN — Medication 40 MILLIGRAM(S): at 06:18

## 2025-04-05 RX ADMIN — APIXABAN 5 MILLIGRAM(S): 2.5 TABLET, FILM COATED ORAL at 00:37

## 2025-04-05 RX ADMIN — IPRATROPIUM BROMIDE AND ALBUTEROL SULFATE 3 MILLILITER(S): .5; 2.5 SOLUTION RESPIRATORY (INHALATION) at 12:13

## 2025-04-05 RX ADMIN — Medication 1 APPLICATION(S): at 18:29

## 2025-04-05 RX ADMIN — Medication 400 MILLIGRAM(S): at 18:28

## 2025-04-05 RX ADMIN — LEVETIRACETAM 500 MILLIGRAM(S): 10 INJECTION, SOLUTION INTRAVENOUS at 18:28

## 2025-04-05 RX ADMIN — APIXABAN 5 MILLIGRAM(S): 2.5 TABLET, FILM COATED ORAL at 12:13

## 2025-04-05 RX ADMIN — INSULIN LISPRO 4: 100 INJECTION, SOLUTION INTRAVENOUS; SUBCUTANEOUS at 13:58

## 2025-04-05 RX ADMIN — Medication 25 GRAM(S): at 13:58

## 2025-04-05 RX ADMIN — Medication 1 APPLICATION(S): at 06:18

## 2025-04-05 RX ADMIN — Medication 400 MILLIGRAM(S): at 06:18

## 2025-04-05 RX ADMIN — LEVETIRACETAM 500 MILLIGRAM(S): 10 INJECTION, SOLUTION INTRAVENOUS at 06:18

## 2025-04-05 RX ADMIN — INSULIN LISPRO 2 UNIT(S): 100 INJECTION, SOLUTION INTRAVENOUS; SUBCUTANEOUS at 18:40

## 2025-04-05 RX ADMIN — IPRATROPIUM BROMIDE AND ALBUTEROL SULFATE 3 MILLILITER(S): .5; 2.5 SOLUTION RESPIRATORY (INHALATION) at 06:18

## 2025-04-05 RX ADMIN — IPRATROPIUM BROMIDE AND ALBUTEROL SULFATE 3 MILLILITER(S): .5; 2.5 SOLUTION RESPIRATORY (INHALATION) at 18:27

## 2025-04-05 RX ADMIN — Medication 25 GRAM(S): at 06:18

## 2025-04-06 LAB
CULTURE RESULTS: SIGNIFICANT CHANGE UP
CULTURE RESULTS: SIGNIFICANT CHANGE UP
SPECIMEN SOURCE: SIGNIFICANT CHANGE UP
SPECIMEN SOURCE: SIGNIFICANT CHANGE UP

## 2025-04-07 ENCOUNTER — TRANSCRIPTION ENCOUNTER (OUTPATIENT)
Age: 75
End: 2025-04-07

## 2025-04-07 RX ORDER — INSULIN GLARGINE-YFGN 100 [IU]/ML
6 INJECTION, SOLUTION SUBCUTANEOUS
Refills: 0 | DISCHARGE

## 2025-04-07 RX ORDER — INSULIN ASPART 100 [IU]/ML
4 INJECTION, SOLUTION INTRAVENOUS; SUBCUTANEOUS
Refills: 0 | DISCHARGE

## 2025-04-07 RX ORDER — ASPIRIN 325 MG
1 TABLET ORAL
Refills: 0 | DISCHARGE

## 2025-04-17 DIAGNOSIS — I27.82 CHRONIC PULMONARY EMBOLISM: ICD-10-CM

## 2025-04-17 DIAGNOSIS — I24.89 OTHER FORMS OF ACUTE ISCHEMIC HEART DISEASE: ICD-10-CM

## 2025-04-17 DIAGNOSIS — L89.150 PRESSURE ULCER OF SACRAL REGION, UNSTAGEABLE: ICD-10-CM

## 2025-04-17 DIAGNOSIS — F32.A DEPRESSION, UNSPECIFIED: ICD-10-CM

## 2025-04-17 DIAGNOSIS — A41.02 SEPSIS DUE TO METHICILLIN RESISTANT STAPHYLOCOCCUS AUREUS: ICD-10-CM

## 2025-04-17 DIAGNOSIS — I50.30 UNSPECIFIED DIASTOLIC (CONGESTIVE) HEART FAILURE: ICD-10-CM

## 2025-04-17 DIAGNOSIS — Z74.01 BED CONFINEMENT STATUS: ICD-10-CM

## 2025-04-17 DIAGNOSIS — G40.909 EPILEPSY, UNSPECIFIED, NOT INTRACTABLE, WITHOUT STATUS EPILEPTICUS: ICD-10-CM

## 2025-04-17 DIAGNOSIS — C85.10 UNSPECIFIED B-CELL LYMPHOMA, UNSPECIFIED SITE: ICD-10-CM

## 2025-04-17 DIAGNOSIS — K21.9 GASTRO-ESOPHAGEAL REFLUX DISEASE WITHOUT ESOPHAGITIS: ICD-10-CM

## 2025-04-17 DIAGNOSIS — E11.9 TYPE 2 DIABETES MELLITUS WITHOUT COMPLICATIONS: ICD-10-CM

## 2025-04-17 DIAGNOSIS — N30.80 OTHER CYSTITIS WITHOUT HEMATURIA: ICD-10-CM

## 2025-04-17 DIAGNOSIS — Z66 DO NOT RESUSCITATE: ICD-10-CM

## 2025-04-17 DIAGNOSIS — E43 UNSPECIFIED SEVERE PROTEIN-CALORIE MALNUTRITION: ICD-10-CM

## 2025-04-17 DIAGNOSIS — K52.9 NONINFECTIVE GASTROENTERITIS AND COLITIS, UNSPECIFIED: ICD-10-CM

## 2025-04-17 DIAGNOSIS — Z79.84 LONG TERM (CURRENT) USE OF ORAL HYPOGLYCEMIC DRUGS: ICD-10-CM

## 2025-04-17 DIAGNOSIS — Z93.1 GASTROSTOMY STATUS: ICD-10-CM

## 2025-04-17 DIAGNOSIS — Z99.81 DEPENDENCE ON SUPPLEMENTAL OXYGEN: ICD-10-CM

## 2025-04-17 DIAGNOSIS — R62.7 ADULT FAILURE TO THRIVE: ICD-10-CM

## 2025-04-17 DIAGNOSIS — R45.1 RESTLESSNESS AND AGITATION: ICD-10-CM

## 2025-04-17 DIAGNOSIS — R71.8 OTHER ABNORMALITY OF RED BLOOD CELLS: ICD-10-CM

## 2025-04-17 DIAGNOSIS — N81.4 UTEROVAGINAL PROLAPSE, UNSPECIFIED: ICD-10-CM

## 2025-04-17 DIAGNOSIS — R64 CACHEXIA: ICD-10-CM

## 2025-04-17 DIAGNOSIS — J96.21 ACUTE AND CHRONIC RESPIRATORY FAILURE WITH HYPOXIA: ICD-10-CM

## 2025-04-17 DIAGNOSIS — E78.5 HYPERLIPIDEMIA, UNSPECIFIED: ICD-10-CM

## 2025-04-17 DIAGNOSIS — D64.9 ANEMIA, UNSPECIFIED: ICD-10-CM

## 2025-04-17 DIAGNOSIS — G92.00: ICD-10-CM

## 2025-04-17 DIAGNOSIS — J18.9 PNEUMONIA, UNSPECIFIED ORGANISM: ICD-10-CM

## 2025-04-17 DIAGNOSIS — Z79.01 LONG TERM (CURRENT) USE OF ANTICOAGULANTS: ICD-10-CM

## 2025-04-23 ENCOUNTER — TRANSCRIPTION ENCOUNTER (OUTPATIENT)
Age: 75
End: 2025-04-23

## 2025-06-18 ENCOUNTER — TRANSCRIPTION ENCOUNTER (OUTPATIENT)
Age: 75
End: 2025-06-18